# Patient Record
Sex: FEMALE | Race: BLACK OR AFRICAN AMERICAN | Employment: FULL TIME | URBAN - NONMETROPOLITAN AREA
[De-identification: names, ages, dates, MRNs, and addresses within clinical notes are randomized per-mention and may not be internally consistent; named-entity substitution may affect disease eponyms.]

---

## 2020-07-28 ENCOUNTER — VIRTUAL VISIT (OUTPATIENT)
Dept: FAMILY MEDICINE CLINIC | Age: 35
End: 2020-07-28
Payer: MEDICAID

## 2020-07-28 VITALS
HEIGHT: 65 IN | BODY MASS INDEX: 41.25 KG/M2 | OXYGEN SATURATION: 100 % | SYSTOLIC BLOOD PRESSURE: 156 MMHG | TEMPERATURE: 98 F | WEIGHT: 247.6 LBS | HEART RATE: 84 BPM | RESPIRATION RATE: 18 BRPM | DIASTOLIC BLOOD PRESSURE: 90 MMHG

## 2020-07-28 DIAGNOSIS — I10 ESSENTIAL HYPERTENSION: Primary | ICD-10-CM

## 2020-07-28 PROBLEM — K21.9 GASTROESOPHAGEAL REFLUX DISEASE WITHOUT ESOPHAGITIS: Status: ACTIVE | Noted: 2020-01-30

## 2020-07-28 PROBLEM — F31.81 BIPOLAR II DISORDER (HCC): Status: ACTIVE | Noted: 2018-06-26

## 2020-07-28 PROBLEM — E78.5 HYPERLIPIDEMIA: Status: ACTIVE | Noted: 2020-01-30

## 2020-07-28 PROBLEM — R05.3 PERSISTENT COUGH: Status: ACTIVE | Noted: 2020-01-30

## 2020-07-28 PROBLEM — E05.90 HYPERTHYROIDISM: Status: ACTIVE | Noted: 2019-01-03

## 2020-07-28 PROBLEM — R73.03 PREDIABETES: Status: ACTIVE | Noted: 2020-06-29

## 2020-07-28 PROBLEM — E04.9 GOITER: Status: ACTIVE | Noted: 2020-06-29

## 2020-07-28 PROBLEM — R53.83 FATIGUE: Status: ACTIVE | Noted: 2020-07-28

## 2020-07-28 PROBLEM — E03.9 HYPOTHYROIDISM: Status: ACTIVE | Noted: 2019-09-05

## 2020-07-28 PROBLEM — G43.909 MIGRAINE: Status: ACTIVE | Noted: 2020-07-28

## 2020-07-28 PROCEDURE — 99441 PR PHYS/QHP TELEPHONE EVALUATION 5-10 MIN: CPT | Performed by: NURSE PRACTITIONER

## 2020-07-28 RX ORDER — LOVASTATIN 20 MG/1
20 TABLET ORAL
COMMUNITY
End: 2020-08-04 | Stop reason: DRUGHIGH

## 2020-07-28 RX ORDER — CHOLECALCIFEROL TAB 125 MCG (5000 UNIT) 125 MCG
TAB ORAL DAILY
COMMUNITY
End: 2021-10-16

## 2020-07-28 RX ORDER — VALPROIC ACID 250 MG/1
CAPSULE, LIQUID FILLED ORAL 3 TIMES DAILY
COMMUNITY
End: 2020-12-15

## 2020-07-28 RX ORDER — METFORMIN HYDROCHLORIDE 500 MG/1
500 TABLET, FILM COATED, EXTENDED RELEASE ORAL ONCE
COMMUNITY
End: 2020-12-15 | Stop reason: SDUPTHER

## 2020-07-28 RX ORDER — OLMESARTAN MEDOXOMIL 20 MG/1
20 TABLET ORAL DAILY
COMMUNITY
End: 2020-08-17

## 2020-07-28 RX ORDER — MONTELUKAST SODIUM 10 MG/1
10 TABLET ORAL DAILY
COMMUNITY
End: 2021-07-15

## 2020-07-28 RX ORDER — LEVOTHYROXINE SODIUM 50 UG/1
TABLET ORAL
COMMUNITY

## 2020-07-28 RX ORDER — LEVONORGESTREL 52 MG/1
1 INTRAUTERINE DEVICE INTRAUTERINE ONCE
COMMUNITY
End: 2021-07-15

## 2020-07-28 RX ORDER — ARIPIPRAZOLE 10 MG/1
10 TABLET ORAL DAILY
COMMUNITY
End: 2022-03-26

## 2020-07-28 RX ORDER — BROMOCRIPTINE MESYLATE 2.5 MG/1
2.5 TABLET ORAL DAILY
COMMUNITY
End: 2021-03-01

## 2020-07-28 NOTE — PROGRESS NOTES
Luís Yanes is a 37 y.o. female, evaluated via audio-only technology on 7/28/2020 for Hypertension  . Assessment & Plan:   Diagnoses and all orders for this visit:    1. Essential hypertension  Diet and Lifestyle: generally follows a low fat low cholesterol diet, exercises regularly  Home BP Monitoring: is well controlled at home, ranging    12  Subjective:     Luís Yanes is a 37 y.o. female with hypertension. Hypertension  Patient is here for follow-up of hypertension. She indicates that she is feeling well and denies any symptoms referable to her hypertension. She is exercising and is adherent to low salt diet. Blood pressure is well controlled at home. Current Outpatient Medications   Medication Sig Dispense Refill    ARIPiprazole (ABILIFY) 10 mg tablet Take 10 mg by mouth daily.  BUPROPION HCL PO Take  by mouth.  dupilumab (Dupixent) 300 mg/2 mL syrg syringe 300 mg by SubCUTAneous route.  levothyroxine (SYNTHROID) 50 mcg tablet Take  by mouth Daily (before breakfast).  metFORMIN (GLUMETZA ER) 500 mg TG24 24 hour tablet Take  by mouth.  montelukast (SINGULAIR) 10 mg tablet Take 10 mg by mouth daily.  olmesartan (BENICAR) 20 mg tablet Take 20 mg by mouth daily.  valproic acid (DEPAKENE) 250 mg capsule Take  by mouth three (3) times daily.  cholecalciferol (Vitamin D3) (5000 Units/125 mcg) tab tablet Take  by mouth daily.  escitalopram oxalate (LEXAPRO) 10 mg tablet Take 1 Tab by mouth daily. Indications: MAJOR DEPRESSIVE DISORDER 30 Tab 1    citalopram (CELEXA) 20 mg tablet Take 20 mg by mouth daily.  bromocriptine (PARLODEL) 2.5 mg tablet Take 2.5 mg by mouth daily.  lovastatin (MEVACOR) 20 mg tablet Take 20 mg by mouth nightly.  levonorgestreL (Mirena) 20 mcg/24 hours (5 yrs) 52 mg IUD 1 Device by IntraUTERine route once.  lamoTRIgine (LAMICTAL) 25 mg tablet Take 1 Tab by mouth daily.  Indications: DEPRESSION ASSOCIATED WITH BIPOLAR DISORDER 30 Tab 1    traZODone (DESYREL) 50 mg tablet Take 1 Tab by mouth nightly as needed for Sleep. Indications: MAJOR DEPRESSIVE DISORDER 30 Tab 1    risperiDONE (RISPERDAL) 0.5 mg tablet Take 0.5 mg by mouth nightly. Prior to Admission medications    Medication Sig Start Date End Date Taking? Authorizing Provider   ARIPiprazole (ABILIFY) 10 mg tablet Take 10 mg by mouth daily. Yes Provider, Historical   BUPROPION HCL PO Take  by mouth. Yes Provider, Historical   dupilumab (Dupixent) 300 mg/2 mL syrg syringe 300 mg by SubCUTAneous route. Yes Provider, Historical   levothyroxine (SYNTHROID) 50 mcg tablet Take  by mouth Daily (before breakfast). Yes Provider, Historical   metFORMIN (GLUMETZA ER) 500 mg TG24 24 hour tablet Take  by mouth. Yes Provider, Historical   montelukast (SINGULAIR) 10 mg tablet Take 10 mg by mouth daily. Yes Provider, Historical   olmesartan (BENICAR) 20 mg tablet Take 20 mg by mouth daily. Yes Provider, Historical   valproic acid (DEPAKENE) 250 mg capsule Take  by mouth three (3) times daily. Yes Provider, Historical   cholecalciferol (Vitamin D3) (5000 Units/125 mcg) tab tablet Take  by mouth daily. Yes Provider, Historical   escitalopram oxalate (LEXAPRO) 10 mg tablet Take 1 Tab by mouth daily. Indications: MAJOR DEPRESSIVE DISORDER 9/15/16  Yes Knute Peoples., NP   citalopram (CELEXA) 20 mg tablet Take 20 mg by mouth daily. Yes Other, MD [place]   bromocriptine (PARLODEL) 2.5 mg tablet Take 2.5 mg by mouth daily. Provider, Historical   lovastatin (MEVACOR) 20 mg tablet Take 20 mg by mouth nightly. Provider, Historical   levonorgestreL (Mirena) 20 mcg/24 hours (5 yrs) 52 mg IUD 1 Device by IntraUTERine route once. Provider, Historical   lamoTRIgine (LAMICTAL) 25 mg tablet Take 1 Tab by mouth daily.  Indications: DEPRESSION ASSOCIATED WITH BIPOLAR DISORDER 9/15/16   Knute Peoples., NP   traZODone (DESYREL) 50 mg tablet Take 1 Tab by mouth nightly as needed for Sleep. Indications: MAJOR DEPRESSIVE DISORDER 9/15/16   Columba Luo NP   risperiDONE (RISPERDAL) 0.5 mg tablet Take 0.5 mg by mouth nightly. Other, MD [place]         Review of Systems   Constitutional: Negative for chills and fever. Cardiovascular: Negative for chest pain, palpitations and leg swelling. Neurological: Negative for dizziness and headaches. All other systems reviewed and are negative. No flowsheet data found. Holley Hernandez, who was evaluated through a patient-initiated, synchronous (real-time) audio only encounter, and/or her healthcare decision maker, is aware that it is a billable service, with coverage as determined by her insurance carrier. She provided verbal consent to proceed: Yes. She has not had a related appointment within my department in the past 7 days or scheduled within the next 24 hours.       Total Time: minutes: 5-10 minutes    xAel Josue NP

## 2020-07-28 NOTE — PROGRESS NOTES
Robyn De La Vega presents today for   Chief Complaint   Patient presents with    Hypertension       Depression Screening:  3 most recent PHQ Screens 7/28/2020   Little interest or pleasure in doing things Not at all   Feeling down, depressed, irritable, or hopeless More than half the days   Total Score PHQ 2 2   Trouble falling or staying asleep, or sleeping too much More than half the days   Feeling tired or having little energy More than half the days   Poor appetite, weight loss, or overeating More than half the days   Feeling bad about yourself - or that you are a failure or have let yourself or your family down Not at all   Trouble concentrating on things such as school, work, reading, or watching TV Not at all   Moving or speaking so slowly that other people could have noticed; or the opposite being so fidgety that others notice Not at all   Thoughts of being better off dead, or hurting yourself in some way Not at all   PHQ 9 Score 8       Learning Assessment:  No flowsheet data found. Abuse Screening:  No flowsheet data found. Fall Risk  No flowsheet data found. Health Maintenance reviewed and discussed and ordered per Provider. Health Maintenance Due   Topic Date Due    PAP AKA CERVICAL CYTOLOGY  09/11/2016    A1C test (Diabetic or Prediabetic)  09/14/2017    Lipid Screen  09/14/2017   . Coordination of Care:  1. Have you been to the ER, urgent care clinic since your last visit? Hospitalized since your last visit? No/ No    2. Have you seen or consulted any other health care providers outside of the [address] [place] [address] since your last visit? Include any pap smears or colon screening.  [facility]- psych medication     [employer] contract signed:

## 2020-08-04 RX ORDER — LOVASTATIN 10 MG/1
TABLET ORAL
Qty: 90 TAB | Refills: 1 | Status: SHIPPED | OUTPATIENT
Start: 2020-08-04 | End: 2020-12-15 | Stop reason: SDUPTHER

## 2020-08-17 RX ORDER — OLMESARTAN MEDOXOMIL 20 MG/1
20 TABLET ORAL DAILY
Qty: 30 TAB | Refills: 3 | Status: SHIPPED | OUTPATIENT
Start: 2020-08-17 | End: 2020-08-20 | Stop reason: DRUGHIGH

## 2020-08-17 RX ORDER — OLMESARTAN MEDOXOMIL 20 MG/1
20 TABLET ORAL DAILY
COMMUNITY
End: 2020-08-17 | Stop reason: SDUPTHER

## 2020-08-17 RX ORDER — OLMESARTAN MEDOXOMIL 20 MG/1
TABLET ORAL
Qty: 90 TAB | Refills: 1 | Status: SHIPPED | OUTPATIENT
Start: 2020-08-17 | End: 2020-08-20 | Stop reason: DRUGHIGH

## 2020-08-20 ENCOUNTER — TELEPHONE (OUTPATIENT)
Dept: FAMILY MEDICINE CLINIC | Age: 35
End: 2020-08-20

## 2020-08-20 RX ORDER — OLMESARTAN MEDOXOMIL 40 MG/1
40 TABLET ORAL DAILY
Qty: 90 TAB | Refills: 1 | Status: SHIPPED | OUTPATIENT
Start: 2020-08-20 | End: 2021-02-26

## 2020-08-20 RX ORDER — OLMESARTAN MEDOXOMIL 20 MG/1
20 TABLET ORAL 2 TIMES DAILY
COMMUNITY
End: 2020-08-20 | Stop reason: DRUGHIGH

## 2020-08-20 RX ORDER — OLMESARTAN MEDOXOMIL 20 MG/1
20 TABLET ORAL 2 TIMES DAILY
Qty: 60 TAB | Refills: 3 | Status: CANCELLED | OUTPATIENT
Start: 2020-08-20

## 2020-08-20 NOTE — TELEPHONE ENCOUNTER
Patient called stating you added this medication at her last visit in June 2020. She called you back x 1 week after starting it and you increased to 2 tablets once daily due to her b/p still be elevated. Patient adds when you called in her refill you only called in 1 tablet once daily and needs you to send in 2 tablets once daily.

## 2020-12-15 ENCOUNTER — VIRTUAL VISIT (OUTPATIENT)
Dept: FAMILY MEDICINE CLINIC | Age: 35
End: 2020-12-15
Payer: MEDICAID

## 2020-12-15 DIAGNOSIS — E55.9 VITAMIN D DEFICIENCY: ICD-10-CM

## 2020-12-15 DIAGNOSIS — E03.9 ACQUIRED HYPOTHYROIDISM: Primary | ICD-10-CM

## 2020-12-15 DIAGNOSIS — E78.2 MIXED HYPERLIPIDEMIA: ICD-10-CM

## 2020-12-15 DIAGNOSIS — I10 ESSENTIAL HYPERTENSION: ICD-10-CM

## 2020-12-15 DIAGNOSIS — R73.03 PREDIABETES: ICD-10-CM

## 2020-12-15 PROCEDURE — 99443 PR PHYS/QHP TELEPHONE EVALUATION 21-30 MIN: CPT | Performed by: NURSE PRACTITIONER

## 2020-12-15 RX ORDER — METFORMIN HYDROCHLORIDE 500 MG/1
TABLET, FILM COATED, EXTENDED RELEASE ORAL
Qty: 90 TAB | Refills: 1 | Status: SHIPPED | OUTPATIENT
Start: 2020-12-15 | End: 2021-01-14 | Stop reason: SDUPTHER

## 2020-12-15 RX ORDER — LOVASTATIN 10 MG/1
TABLET ORAL
Qty: 90 TAB | Refills: 1 | Status: SHIPPED | OUTPATIENT
Start: 2020-12-15 | End: 2022-03-26

## 2020-12-15 NOTE — PROGRESS NOTES
Jac Abdullahi is a 37 y.o. female, evaluated via audio-only technology on 12/15/2020 for Follow Up Chronic Condition; Hypothyroidism; and Hypertension  . Assessment & Plan:   Diagnoses and all orders for this visit:    1. Acquired hypothyroidism  -She continues with Levothyroxine as prescribed   -Checking labs, I will contact her with results and plan   -     TSH AND FREE T4    2. Vitamin D deficiency  -     VITAMIN D, 25 HYDROXY    3. Mixed hyperlipidemia  -Noncompliant with medication use,patient educated on the importance of medication compliance, I have asked her to bring a copy of lab work for review. Labs were drawn two weeks ago. - Restarting medication she was previously on   -     lovastatin (MEVACOR) 10 mg tablet; TAKE 1 TABLET BY MOUTH EVERY NIGHT AT BEDTIME  Indications: high cholesterol and high triglycerides    4. Prediabetes  -Restart medication, diet reviewed   -     metFORMIN (GLUMETZA ER) 500 mg TG24 24 hour tablet; Take one tab by mouth once daily  Indications: prevention of type 2 diabetes mellitus    5. Essential hypertension  -Patient was unable to provide blood pressure readings   - She is prescribed Olmesartan 40 mg and will continue with this. - She is working on getting a blood pressure cuff. She can come into the office for blood pressure check. 12  Subjective:   Patient is here today for follow-up of hypertension, prediabetes, and hyperlipidemia. Patient reports she is followed by SHARMIN Carpio for Bipolar II disorder. She reports being told her prediabetes has worsened, blood pressure is uncontrolled, and cholesterol is elevated. Patient was prescribed Metformin, statin medication and hypertension medication several months ago but has not been taking medication. She does not have a copy of lab results and is unsure what her blood pressure readings have been. She denies any shortness of breath or chest pains.  No lightheadedness or dizziness. No abdominal pains. No urinary concerns. No worsening of Bipolar symptoms, controlled with medication use and counseling. Prior to Admission medications    Medication Sig Start Date End Date Taking? Authorizing Provider   olmesartan (BENICAR) 40 mg tablet Take 1 Tab by mouth daily. 8/20/20  Yes Beth Granados NP   ARIPiprazole (ABILIFY) 10 mg tablet Take 10 mg by mouth daily. Yes Provider, Historical   bromocriptine (PARLODEL) 2.5 mg tablet Take 2.5 mg by mouth daily. Yes Provider, Historical   BUPROPION HCL PO Take 200 mg by mouth once. Yes Provider, Historical   levothyroxine (SYNTHROID) 50 mcg tablet Take  by mouth Daily (before breakfast). Yes Provider, Historical   metFORMIN (GLUMETZA ER) 500 mg TG24 24 hour tablet Take 500 mg by mouth once. Yes Provider, Historical   cholecalciferol (Vitamin D3) (5000 Units/125 mcg) tab tablet Take  by mouth daily. Yes Provider, Historical   lovastatin (MEVACOR) 10 mg tablet TAKE 1 TABLET BY MOUTH EVERY NIGHT AT BEDTIME 8/4/20   Tayler Swan NP   dupilumab (Dupixent) 300 mg/2 mL syrg syringe 300 mg by SubCUTAneous route. Provider, Historical   levonorgestreL (Mirena) 20 mcg/24 hours (5 yrs) 52 mg IUD 1 Device by IntraUTERine route once. Provider, Historical   montelukast (SINGULAIR) 10 mg tablet Take 10 mg by mouth daily. Provider, Historical   valproic acid (DEPAKENE) 250 mg capsule Take  by mouth three (3) times daily. Provider, Historical   escitalopram oxalate (LEXAPRO) 10 mg tablet Take 1 Tab by mouth daily. Indications: MAJOR DEPRESSIVE DISORDER 9/15/16   Russel Collet., NP   lamoTRIgine (LAMICTAL) 25 mg tablet Take 1 Tab by mouth daily. Indications: DEPRESSION ASSOCIATED WITH BIPOLAR DISORDER 9/15/16   Clint Collet., NP   traZODone (DESYREL) 50 mg tablet Take 1 Tab by mouth nightly as needed for Sleep.  Indications: MAJOR DEPRESSIVE DISORDER 9/15/16   Russelt Collet., NP   citalopram (CELEXA) 20 mg tablet Take 20 mg by mouth daily. [place] Chambers MD   risperiDONE (RISPERDAL) 0.5 mg tablet Take 0.5 mg by mouth nightly. [place] Chambers MD         Review of Systems   Constitutional: Negative for chills, fever, malaise/fatigue and weight loss. HENT: Negative for sinus pain and sore throat. Eyes: Negative for blurred vision. Respiratory: Negative for cough, sputum production, shortness of breath and wheezing. Cardiovascular: Negative for chest pain, palpitations and leg swelling. Gastrointestinal: Negative for abdominal pain, constipation, diarrhea and heartburn. Genitourinary: Negative for dysuria and frequency. Musculoskeletal: Negative for falls and myalgias. Skin: Negative for rash. Neurological: Negative for dizziness, tingling, weakness and headaches. Endo/Heme/Allergies: Does not bruise/bleed easily. Psychiatric/Behavioral: Negative for depression. The patient is not nervous/anxious and does not have insomnia. Physical Exam  Vitals signs and nursing note reviewed. Constitutional:       Comments: Physical exam was deferred due to COVID- 19 precautions as visit was completed via telemedicine. No flowsheet data found. Meryle Como, who was evaluated through a patient-initiated, synchronous (real-time) audio only encounter, and/or her healthcare decision maker, is aware that it is a billable service, with coverage as determined by her insurance carrier. She provided verbal consent to proceed: Yes. She has not had a related appointment within my department in the past 7 days or scheduled within the next 24 hours.       Total Time: minutes: 21-30 minutes    Kai Liang NP

## 2020-12-15 NOTE — PROGRESS NOTES
Paul Ferraro presents today for   Chief Complaint   Patient presents with    Follow Up Chronic Condition    Hypothyroidism    Hypertension       Depression Screening:  3 most recent PHQ Screens 12/15/2020   Little interest or pleasure in doing things Not at all   Feeling down, depressed, irritable, or hopeless Not at all   Total Score PHQ 2 0   Trouble falling or staying asleep, or sleeping too much -   Feeling tired or having little energy -   Poor appetite, weight loss, or overeating -   Feeling bad about yourself - or that you are a failure or have let yourself or your family down -   Trouble concentrating on things such as school, work, reading, or watching TV -   Moving or speaking so slowly that other people could have noticed; or the opposite being so fidgety that others notice -   Thoughts of being better off dead, or hurting yourself in some way -   PHQ 9 Score -       Learning Assessment:  Learning Assessment 12/15/2020   PRIMARY LEARNER Patient   HIGHEST LEVEL OF EDUCATION - PRIMARY LEARNER  GRADUATED HIGH SCHOOL OR GED   BARRIERS PRIMARY LEARNER NONE   CO-LEARNER CAREGIVER No   PRIMARY LANGUAGE ENGLISH   LEARNER PREFERENCE PRIMARY DEMONSTRATION   ANSWERED BY Janette Falk   RELATIONSHIP SELF       Abuse Screening:  No flowsheet data found. Fall Risk  Fall Risk Assessment, last 12 mths 12/15/2020   Able to walk? Yes   Fall in past 12 months?  No       ADL  ADL Assessment 12/15/2020   Feeding yourself No Help Needed   Getting from bed to chair No Help Needed   Getting dressed No Help Needed   Bathing or showering No Help Needed   Walk across the room (includes cane/walker) No Help Needed   Using the telphone No Help Needed   Taking your medications No Help Needed   Preparing meals No Help Needed   Managing money (expenses/bills) No Help Needed   Moderately strenuous housework (laundry) No Help Needed   Shopping for personal items (toiletries/medicines) No Help Needed   Shopping for groceries No Help Needed   Driving No Help Needed   Climbing a flight of stairs No Help Needed   Getting to places beyond walking distances No Help Needed       Health Maintenance reviewed and discussed and ordered per Provider. Health Maintenance Due   Topic Date Due    PAP AKA CERVICAL CYTOLOGY  09/11/2016    A1C test (Diabetic or Prediabetic)  09/14/2017    Lipid Screen  09/14/2017    Flu Vaccine (1) 09/01/2020   . Coordination of Care:  1. Have you been to the ER, urgent care clinic since your last visit? Hospitalized since your last visit? No    2. Have you seen or consulted any other health care providers outside of the [facility] [place] [address] [place] since your last visit? Include any pap smears or colon screening.  [place]-x 2 weeks dallin    Pap- 2020 per patient

## 2021-01-14 ENCOUNTER — VIRTUAL VISIT (OUTPATIENT)
Dept: FAMILY MEDICINE CLINIC | Age: 36
End: 2021-01-14
Payer: MEDICAID

## 2021-01-14 DIAGNOSIS — E78.2 MODERATE MIXED HYPERLIPIDEMIA NOT REQUIRING STATIN THERAPY: ICD-10-CM

## 2021-01-14 DIAGNOSIS — I10 ESSENTIAL HYPERTENSION: Primary | ICD-10-CM

## 2021-01-14 DIAGNOSIS — R73.03 PREDIABETES: ICD-10-CM

## 2021-01-14 DIAGNOSIS — I10 ESSENTIAL HYPERTENSION: ICD-10-CM

## 2021-01-14 PROCEDURE — 99213 OFFICE O/P EST LOW 20 MIN: CPT | Performed by: INTERNAL MEDICINE

## 2021-01-14 RX ORDER — AMLODIPINE BESYLATE 5 MG/1
5 TABLET ORAL DAILY
Qty: 30 TAB | Refills: 2 | Status: SHIPPED | OUTPATIENT
Start: 2021-01-14 | End: 2021-03-03 | Stop reason: SDUPTHER

## 2021-01-14 RX ORDER — METFORMIN HYDROCHLORIDE 500 MG/1
TABLET, FILM COATED, EXTENDED RELEASE ORAL
Qty: 90 TAB | Refills: 1 | Status: SHIPPED | OUTPATIENT
Start: 2021-01-14 | End: 2021-01-18 | Stop reason: SDUPTHER

## 2021-01-14 NOTE — PROGRESS NOTES
Patient in for virtual visit complains of elevated blood pressure. She is taking BP at home with readings of 171/107, 154/100, 153/98. This has been going on since medication was changed on 12/15/20. Patient is complaining of headaches when her BP is high. Princess Dyan LPN    1. Have you been to the ER, urgent care clinic since your last visit? Hospitalized since your last visit? No    2. Have you seen or consulted any other health care providers outside of the [address] [place] [address] since your last visit? Include any pap smears or colon screening.  No

## 2021-01-14 NOTE — PATIENT INSTRUCTIONS
Type 2 Diabetes: Care Instructions Your Care Instructions Type 2 diabetes is a disease that develops when the body's tissues cannot use insulin properly. Over time, the pancreas cannot make enough insulin. Insulin is a hormone that helps the body's cells use sugar (glucose) for energy. It also helps the body store extra sugar in muscle, fat, and liver cells. Without insulin, the sugar cannot get into the cells to do its work. It stays in the blood instead. This can cause high blood sugar levels. A person has diabetes when the blood sugar stays too high too much of the time. Over time, diabetes can lead to diseases of the heart, blood vessels, nerves, kidneys, and eyes. You may be able to control your blood sugar by losing weight, eating a healthy diet, and getting daily exercise. You may also have to take insulin or other diabetes medicine. Follow-up care is a key part of your treatment and safety. Be sure to make and go to all appointments. Call your doctor if you are having problems. It's also a good idea to know your test results and keep a list of the medicines you take. How can you care for yourself at home? · Keep your blood sugar at a target level (which you set with your doctor). ? Eat a good diet that spreads carbohydrate throughout the day. Carbohydratethe body's main source of fuelaffects blood sugar more than any other nutrient. Carbohydrate is in fruits, vegetables, milk, and yogurt. It also is in breads, cereals, vegetables such as potatoes and corn, and sugary foods such as candy and cakes. ? Aim for 30 minutes of exercise on most, preferably all, days of the week. Walking is a good choice. You also may want to do other activities, such as running, swimming, cycling, or playing tennis or team sports. If your doctor says it's okay, do muscle-strengthening exercises at least 2 times a week. ? Take your medicines exactly as prescribed. Call your doctor if you think you are having a problem with your medicine. You will get more details on the specific medicines your doctor prescribes. · Check your blood sugar as often as your doctor recommends. It is important to keep track of any symptoms you have, such as low blood sugar. Also tell your doctor if you have any changes in your activities, diet, or insulin use. · Talk to your doctor before you start taking aspirin every day. Aspirin can help certain people lower their risk of a heart attack or stroke. But taking aspirin isn't right for everyone, because it can cause serious bleeding. · Do not smoke. If you need help quitting, talk to your doctor about stop-smoking programs and medicines. These can increase your chances of quitting for good. · Keep your cholesterol and blood pressure at normal levels. You may need to take one or more medicines to reach your goals. Take them exactly as directed. Do not stop or change a medicine without talking to your doctor first. 
When should you call for help? Call 911 anytime you think you may need emergency care. For example, call if: 
  · You passed out (lost consciousness), or you suddenly become very sleepy or confused. (You may have very low blood sugar.) Call your doctor now or seek immediate medical care if: 
  · Your blood sugar is 300 mg/dL or is higher than the level your doctor has set for you.  
  · You have symptoms of low blood sugar, such as: ? Sweating. ? Feeling nervous, shaky, and weak. ? Extreme hunger and slight nausea. ? Dizziness and headache. 
? Blurred vision. ? Confusion. Watch closely for changes in your health, and be sure to contact your doctor if: 
  · You often have problems controlling your blood sugar.  
  · You have symptoms of long-term diabetes problems, such as: ? New vision changes. ? New pain, numbness, or tingling in your hands or feet. ? Skin problems. Where can you learn more? Go to http://www.gray.com/ Enter C553 in the search box to learn more about \"Type 2 Diabetes: Care Instructions. \" Current as of: December 20, 2019               Content Version: 12.6 © 0405-8607 [employer]. Care instructions adapted under license by [employer] (which disclaims liability or warranty for this information). If you have questions about a medical condition or this instruction, always ask your healthcare professional. [address] any warranty or liability for your use of this information.

## 2021-01-14 NOTE — PROGRESS NOTES
Danuta Mejia (: 1985) is a 37 y.o. female, established patient, here for evaluation of the following chief complaint(s):   Hypertension   Some headaches, 171/101, stays all the time. No syncope. No chest pain    ASSESSMENT/PLAN:  1. Prediabetes  Refill her Metformin as well as follow-up in A1c. Continue to exercise and control weight as possible. - metFORMIN (GLUMETZA ER) 500 mg TG24 24 hour tablet; Take one tab by mouth once daily  Indications: prevention of type 2 diabetes mellitus  Dispense: 90 Tab; Refill: 1  - HEMOGLOBIN A1C WITH EAG; Future  - LIPID PANEL; Future  - METABOLIC PANEL, COMPREHENSIVE; Future  - MICROALBUMIN, UR, RAND W/ MICROALB/CREAT RATIO; Future    2. Essential hypertension  Due to elevated amounts we will go ahead and add her amlodipine to her current regimen of olmesartan. Exercise as well as weight control.  - HEMOGLOBIN A1C WITH EAG; Future  - LIPID PANEL; Future  - METABOLIC PANEL, COMPREHENSIVE; Future  - MICROALBUMIN, UR, RAND W/ MICROALB/CREAT RATIO; Future    3. Moderate mixed hyperlipidemia not requiring statin therapy  Continue to check labs as well as watch diet. - LIPID PANEL; Future  - METABOLIC PANEL, COMPREHENSIVE; Future  - MICROALBUMIN, UR, RAND W/ MICROALB/CREAT RATIO; Future      No follow-ups on file. SUBJECTIVE/OBJECTIVE:  HPI    Review of Systems     No flowsheet data found. Physical Exam    Exam on virtual visit with normal mentation normal speech normal hearing. On this date 21 I have spent 10 minutes reviewing previous notes, test results and face to face (virtual) with the patient discussing the diagnosis and importance of compliance with the treatment plan as well as documenting on the day of the visit. Danuta Mejia is being evaluated by a Virtual Visit (video visit) encounter to address concerns as mentioned above. A caregiver was present when appropriate.  Due to this being a TeleHealth encounter (During [facility] public health emergency), evaluation of the following organ systems was limited: Vitals/Constitutional/EENT/Resp/CV/GI//MS/Neuro/Skin/Heme-Lymph-Imm. Pursuant to the emergency declaration under the [address] [place] [address], [address] [place] [address] authority and the [employer] Act, this Virtual Visit was conducted with patient's (and/or legal guardian's) consent, to reduce the patient's risk of exposure to COVID-19 and provide necessary medical care. The patient (and/or legal guardian) has also been advised to contact this office for worsening conditions or problems, and seek emergency medical treatment and/or call 911 if deemed necessary. Patient identification was verified at the start of the visit: YES    Services were provided through a video synchronous discussion virtually to substitute for in-person clinic visit. Patient was located at home and provider was located in office or at home. An electronic signature was used to authenticate this note.   -- Rigo Courtney MD

## 2021-01-18 DIAGNOSIS — R73.03 PREDIABETES: ICD-10-CM

## 2021-01-18 RX ORDER — METFORMIN HYDROCHLORIDE 500 MG/1
TABLET, FILM COATED, EXTENDED RELEASE ORAL
Qty: 90 TAB | Refills: 3 | Status: SHIPPED | OUTPATIENT
Start: 2021-01-18 | End: 2021-01-22 | Stop reason: ALTCHOICE

## 2021-01-22 ENCOUNTER — TELEPHONE (OUTPATIENT)
Dept: FAMILY MEDICINE CLINIC | Age: 36
End: 2021-01-22

## 2021-01-22 DIAGNOSIS — R73.03 PREDIABETES: Primary | ICD-10-CM

## 2021-01-22 RX ORDER — METFORMIN HYDROCHLORIDE 500 MG/1
500 TABLET, EXTENDED RELEASE ORAL 2 TIMES DAILY
Qty: 60 TAB | Refills: 2 | Status: SHIPPED | OUTPATIENT
Start: 2021-01-22 | End: 2021-10-16

## 2021-02-26 RX ORDER — OLMESARTAN MEDOXOMIL 40 MG/1
TABLET ORAL
Qty: 90 TAB | Refills: 1 | Status: SHIPPED | OUTPATIENT
Start: 2021-02-26 | End: 2021-04-14 | Stop reason: ALTCHOICE

## 2021-03-01 ENCOUNTER — APPOINTMENT (OUTPATIENT)
Dept: GENERAL RADIOLOGY | Age: 36
End: 2021-03-01
Attending: FAMILY MEDICINE
Payer: MEDICAID

## 2021-03-01 ENCOUNTER — HOSPITAL ENCOUNTER (EMERGENCY)
Age: 36
Discharge: HOME | End: 2021-03-01
Attending: FAMILY MEDICINE
Payer: MEDICAID

## 2021-03-01 VITALS
SYSTOLIC BLOOD PRESSURE: 153 MMHG | RESPIRATION RATE: 16 BRPM | HEIGHT: 66 IN | DIASTOLIC BLOOD PRESSURE: 94 MMHG | HEART RATE: 86 BPM | WEIGHT: 245 LBS | BODY MASS INDEX: 39.37 KG/M2 | TEMPERATURE: 98.1 F | OXYGEN SATURATION: 100 %

## 2021-03-01 DIAGNOSIS — I15.9 SECONDARY HYPERTENSION: ICD-10-CM

## 2021-03-01 DIAGNOSIS — R00.2 PALPITATIONS: Primary | ICD-10-CM

## 2021-03-01 LAB
ANION GAP SERPL CALC-SCNC: 9 MMOL/L
BASOPHILS # BLD: 0 K/UL (ref 0–0.1)
BASOPHILS NFR BLD: 1 % (ref 0–2)
BUN SERPL-MCNC: 16 MG/DL (ref 9–21)
BUN/CREAT SERPL: 20
CA-I BLD-MCNC: 9.1 MG/DL (ref 8.5–10.5)
CHLORIDE SERPL-SCNC: 106 MMOL/L (ref 94–111)
CO2 SERPL-SCNC: 23 MMOL/L (ref 21–33)
CREAT SERPL-MCNC: 0.8 MG/DL (ref 0.7–1.2)
EOSINOPHIL # BLD: 0.1 K/UL (ref 0–0.4)
EOSINOPHIL NFR BLD: 2 % (ref 0–5)
ERYTHROCYTE [DISTWIDTH] IN BLOOD BY AUTOMATED COUNT: 14.6 % (ref 11.6–14.5)
GLUCOSE SERPL-MCNC: 90 MG/DL (ref 70–110)
HCT VFR BLD AUTO: 38.5 % (ref 35–45)
HGB BLD-MCNC: 12.5 G/DL (ref 12–16)
IMM GRANULOCYTES # BLD AUTO: 0 K/UL
IMM GRANULOCYTES NFR BLD AUTO: 0 %
LYMPHOCYTES # BLD: 2.6 K/UL (ref 0.9–3.6)
LYMPHOCYTES NFR BLD: 40 % (ref 21–52)
MAGNESIUM SERPL-MCNC: 2.1 MG/DL (ref 1.7–2.8)
MCH RBC QN AUTO: 28.7 PG (ref 24–34)
MCHC RBC AUTO-ENTMCNC: 32.5 G/DL (ref 31–37)
MCV RBC AUTO: 88.3 FL (ref 74–97)
MONOCYTES # BLD: 0.5 K/UL (ref 0.05–1.2)
MONOCYTES NFR BLD: 8 % (ref 3–10)
NEUTS SEG # BLD: 3.2 K/UL (ref 1.8–8)
NEUTS SEG NFR BLD: 49 % (ref 40–73)
PLATELET # BLD AUTO: 341 K/UL (ref 135–420)
PMV BLD AUTO: 10.5 FL (ref 9.2–11.8)
POTASSIUM SERPL-SCNC: 4.1 MMOL/L (ref 3.2–5.1)
RBC # BLD AUTO: 4.36 M/UL (ref 4.2–5.3)
SODIUM SERPL-SCNC: 138 MMOL/L (ref 135–145)
TROPONIN I SERPL-MCNC: <0.02 NG/ML (ref 0.02–0.05)
WBC # BLD AUTO: 6.5 K/UL (ref 4.6–13.2)

## 2021-03-01 PROCEDURE — 71045 X-RAY EXAM CHEST 1 VIEW: CPT

## 2021-03-01 PROCEDURE — 85025 COMPLETE CBC W/AUTO DIFF WBC: CPT

## 2021-03-01 PROCEDURE — 93005 ELECTROCARDIOGRAM TRACING: CPT

## 2021-03-01 PROCEDURE — 99284 EMERGENCY DEPT VISIT MOD MDM: CPT

## 2021-03-01 PROCEDURE — 84484 ASSAY OF TROPONIN QUANT: CPT

## 2021-03-01 PROCEDURE — 83735 ASSAY OF MAGNESIUM: CPT

## 2021-03-01 PROCEDURE — 80048 BASIC METABOLIC PNL TOTAL CA: CPT

## 2021-03-01 RX ORDER — HYDROCHLOROTHIAZIDE 25 MG/1
25 TABLET ORAL DAILY
Qty: 30 TAB | Refills: 0 | Status: SHIPPED | OUTPATIENT
Start: 2021-03-01 | End: 2021-03-31

## 2021-03-01 RX ORDER — HYDROXYZINE 50 MG/1
50 TABLET, FILM COATED ORAL
COMMUNITY
End: 2021-07-15

## 2021-03-01 NOTE — ED PROVIDER NOTES
EMERGENCY DEPARTMENT HISTORY AND PHYSICAL EXAM      Date: 3/1/2021  Patient Name: Radha Vieira    History of Presenting Illness     Chief Complaint   Patient presents with    Hypertension       History Provided By: Patient    HPI: Radha Vieira, 37 y.o. female with pertinent PMHx of anxiety, bipolar disorder, prediabetes, hypertension, hyperlipidemia, hypothyroidism presents to the ED c/o elevated blood pressure x 3 days. Pt was started on a new medication 2 months ago, but states the medication does not effectively control her blood pressure. Pt reports having an associated headache x 3 days. Pain is located in the left frontal area and described as pounding when present. Pain comes and goes, and is not present at this time. She also reports palpitations since yesterday, but denies CP, SOB, N/V, leg swelling, fevers, chills, or any other sx. Pt has taken Tylenol w/good pain relief. In ED, patient's BP is 164/96. There are no other complaints, changes, or physical findings at this time. PCP: Elena Lam NP    No current facility-administered medications on file prior to encounter. Current Outpatient Medications on File Prior to Encounter   Medication Sig Dispense Refill    hydrOXYzine HCL (ATARAX) 50 mg tablet Take 50 mg by mouth three (3) times daily as needed.  olmesartan (BENICAR) 40 mg tablet TAKE 1 TABLET BY MOUTH DAILY 90 Tab 1    metFORMIN ER (GLUCOPHAGE XR) 500 mg tablet Take 1 Tab by mouth two (2) times a day. 60 Tab 2    amLODIPine (NORVASC) 5 mg tablet Take 1 Tab by mouth daily. 30 Tab 2    lovastatin (MEVACOR) 10 mg tablet TAKE 1 TABLET BY MOUTH EVERY NIGHT AT BEDTIME  Indications: high cholesterol and high triglycerides 90 Tab 1    ARIPiprazole (ABILIFY) 10 mg tablet Take 10 mg by mouth daily.  BUPROPION HCL PO Take 200 mg by mouth once.  levothyroxine (SYNTHROID) 50 mcg tablet Take  by mouth Daily (before breakfast).       levonorgestreL (Mirena) 20 mcg/24 hours (5 yrs) 52 mg IUD 1 Device by IntraUTERine route once.  montelukast (SINGULAIR) 10 mg tablet Take 10 mg by mouth daily.  cholecalciferol (Vitamin D3) (5000 Units/125 mcg) tab tablet Take  by mouth daily.  [DISCONTINUED] bromocriptine (PARLODEL) 2.5 mg tablet Take 2.5 mg by mouth daily. Past History     Past Medical History:  Past Medical History:   Diagnosis Date    Anxiety     Bipolar II disorder ([facility] [address] 6/26/2018    Depression     Essential hypertension 1/30/2020    Fatigue 7/28/2020    Gastroesophageal reflux disease without esophagitis 1/30/2020    Goiter 6/29/2020    Hyperlipidemia 1/30/2020    Hyperthyroidism 1/3/2019    Hypothyroidism 9/5/2019    Migraine 7/28/2020    Panic attack     Persistent cough 1/30/2020    Postpartum depression 7/28/2020    Prediabetes 6/29/2020    Psychiatric disorder     depression with SI   Migraines     Sleep disorder     Suicidal thoughts        Past Surgical History:  Past Surgical History:   Procedure Laterality Date    HX GYN      2 c-sections       Family History:  Family History   Problem Relation Age of Onset    Diabetes Paternal Aunt     Diabetes Maternal Grandmother     Hypertension Maternal Grandmother     Heart Disease Mother     Diabetes Father        Social History:  Social History     Tobacco Use    Smoking status: Never Smoker    Smokeless tobacco: Never Used   Substance Use Topics    Alcohol use: No    Drug use: No       Allergies: Allergies   Allergen Reactions    Codeine Shortness of Breath    Flexeril [Cyclobenzaprine] Unknown (comments)    [place] Unknown (comments)         Review of Systems   Review of Systems   Constitutional: Negative for chills, fatigue and fever. HENT: Negative for congestion, rhinorrhea and sore throat. Eyes: Negative for pain, redness and visual disturbance. Respiratory: Negative for cough, shortness of breath and wheezing. Cardiovascular: Positive for palpitations. Negative for chest pain and leg swelling. Gastrointestinal: Negative for abdominal pain, diarrhea, nausea and vomiting. Musculoskeletal: Negative for arthralgias, back pain and myalgias. Skin: Negative for color change and rash. Neurological: Negative for dizziness, light-headedness and headaches. Psychiatric/Behavioral: Negative for confusion. The patient is nervous/anxious. Physical Exam   Physical Exam  Vitals signs and nursing note reviewed. Constitutional:       General: She is awake. She is not in acute distress. Appearance: Normal appearance. She is well-developed and overweight. She is not ill-appearing, toxic-appearing or diaphoretic. Interventions: Face mask in place. HENT:      Head: Normocephalic and atraumatic. Eyes:      Conjunctiva/sclera: Conjunctivae normal.      Pupils: Pupils are equal, round, and reactive to light. Neck:      Musculoskeletal: Normal range of motion. Cardiovascular:      Rate and Rhythm: Normal rate and regular rhythm. Pulses: Normal pulses. Heart sounds: Normal heart sounds. Pulmonary:      Effort: Pulmonary effort is normal.      Breath sounds: Normal breath sounds. Abdominal:      General: Abdomen is flat. Palpations: Abdomen is soft. Tenderness: There is no abdominal tenderness. Musculoskeletal:      Right lower leg: No edema. Left lower leg: No edema. Skin:     General: Skin is warm and dry. Neurological:      Mental Status: She is alert and oriented to person, place, and time. GCS: GCS eye subscore is 4. GCS verbal subscore is 5. GCS motor subscore is 6. Psychiatric:         Mood and Affect: Affect normal. Mood is anxious. Behavior: Behavior normal. Behavior is cooperative. Thought Content:  Thought content normal.         Diagnostic Study Results     Labs -     Recent Results (from the past 12 hour(s))   CBC WITH AUTOMATED DIFF    Collection Time: 03/01/21  4:55 PM   Result Value Ref Range    WBC 6.5 4.6 - 13.2 K/uL    RBC 4.36 4.20 - 5.30 M/uL    HGB 12.5 12.0 - 16.0 g/dL    HCT 38.5 35.0 - 45.0 %    MCV 88.3 74.0 - 97.0 FL    MCH 28.7 24.0 - 34.0 PG    MCHC 32.5 31.0 - 37.0 g/dL    RDW 14.6 (H) 11.6 - 14.5 %    PLATELET 244 747 - 805 K/uL    MPV 10.5 9.2 - 11.8 FL    NEUTROPHILS 49 40 - 73 %    LYMPHOCYTES 40 21 - 52 %    MONOCYTES 8 3 - 10 %    EOSINOPHILS 2 0 - 5 %    BASOPHILS 1 0 - 2 %    IMMATURE GRANULOCYTES 0 %    ABS. NEUTROPHILS 3.2 1.8 - 8.0 K/UL    ABS. LYMPHOCYTES 2.6 0.9 - 3.6 K/UL    ABS. MONOCYTES 0.5 0.05 - 1.2 K/UL    ABS. EOSINOPHILS 0.1 0.0 - 0.4 K/UL    ABS. BASOPHILS 0.0 0.0 - 0.1 K/UL    ABS. IMM. GRANS. 0.0 K/UL   METABOLIC PANEL, BASIC    Collection Time: 03/01/21  4:55 PM   Result Value Ref Range    Sodium 138 135 - 145 mmol/L    Potassium 4.1 3.2 - 5.1 mmol/L    Chloride 106 94 - 111 mmol/L    CO2 23 21 - 33 mmol/L    Anion gap 9 mmol/L    Glucose 90 70 - 110 mg/dL    BUN 16 9 - 21 mg/dL    Creatinine 0.80 0.70 - 1.20 mg/dL    BUN/Creatinine ratio 20      GFR est AA >60 ml/min/1.73m2    GFR est non-AA >60 ml/min/1.73m2    Calcium 9.1 8.5 - 10.5 mg/dL   TROPONIN I    Collection Time: 03/01/21  4:55 PM   Result Value Ref Range    Troponin-I, Qt. <0.02 (L) 0.02 - 0.05 ng/mL   MAGNESIUM    Collection Time: 03/01/21  4:55 PM   Result Value Ref Range    Magnesium 2.1 1.7 - 2.8 mg/dL       Radiologic Studies -   XR CHEST PORT    (Results Pending)     CT Results  (Last 48 hours)    None        CXR Results  (Last 48 hours)    None            Medical Decision Making   I am the first provider for this patient. I reviewed the vital signs, available nursing notes, past medical history, past surgical history, family history and social history. Vital Signs-Reviewed the patient's vital signs.   Patient Vitals for the past 12 hrs:   Temp Pulse Resp BP SpO2   03/01/21 1752  82 16 (!) 147/91 100 %   03/01/21 1725  82 16 (!) 152/99 100 %   03/01/21 1638 98.1 °F (36.7 °C) 87 16 (!) 164/96 100 %     EKG interpretation (Preliminary): Performed at 4:45 PM; Read at 4:45 PM.  Rhythm: normal sinus rhythm; Rate (approx.) 82; Axis: normal; MD interval: normal; QRS interval: normal ; ST/T wave: normal; Other findings: normal, no evidence of ischemia. Records Reviewed: Nursing Notes and Old Medical Records    The patient presents with palpitations with a differential diagnosis of palpitations, electrolyte abnormality, anemia, and dysrhythmia. ED Course:   Initial assessment performed. The patients presenting problems have been discussed, and they are in agreement with the care plan formulated and outlined with them. I have encouraged them to ask questions as they arise throughout their visit. Provider Notes (Medical Decision Making):     2871 -patient is a 51-year-old female who presented with complaint of palpitations. She is also complaining of her blood pressure being up recently and her having some headaches due to this. She is tried repeatedly to get a hold of her PCP but apparently has been unable to do so. She says she is left messages several times over the last couple weeks. Her blood pressure was slightly elevated on arrival.  Her EKG was normal without ST changes. Her troponin was negative her electrolytes were normal and her CBC was normal.  Her chest x-ray was also normal.  Patient did not have any palpitations while here. I decided to add some HCTZ to her hypertensive regimen. She needs a follow-up with her PCP in 2 weeks. Disposition     Disposition:     PLAN:  1. Current Discharge Medication List      START taking these medications    Details   hydroCHLOROthiazide (HYDRODIURIL) 25 mg tablet Take 1 Tab by mouth daily for 30 days. Qty: 30 Tab, Refills: 0           2.    Follow-up Information     Follow up With Specialties Details Why Contact Info    John Mir NP Nurse Practitioner In 2 weeks  [address] [facility]  [address] [address]  [phone] Return to ED if worse     Diagnosis     Clinical Impression:   1. Palpitations    2. Secondary hypertension        Attestations:    Denise Wolff MD    By signing my name below, Ranjit Ruiz, attalba that this documentation has been prepared under the direction and in presence of Dr. Nelda Patel on 03/01/21. Electronically signed: Barb Fong, 03/01/21, 4:45 PM    Please note that this dictation was completed with [employer], the computer voice recognition software. Quite often unanticipated grammatical, syntax, homophones, and other interpretive errors are inadvertently transcribed by the computer software. Please disregard these errors. Please excuse any errors that have escaped final proofreading. Thank you.

## 2021-03-01 NOTE — ED TRIAGE NOTES
Pt states she has had high blood pressure x 3 days, states she started a new medicine in December but it doesn't work.   Pt states \"they wont answer the phone\" when asked if she called her PCP

## 2021-03-02 LAB
ATRIAL RATE: 83 BPM
CALCULATED P AXIS, ECG09: 10 DEGREES
CALCULATED R AXIS, ECG10: 12 DEGREES
CALCULATED T AXIS, ECG11: 22 DEGREES
DIAGNOSIS, 93000: NORMAL
P-R INTERVAL, ECG05: 165 MS
Q-T INTERVAL, ECG07: 366 MS
QRS DURATION, ECG06: 90 MS
QTC CALCULATION (BEZET), ECG08: 428 MS
VENTRICULAR RATE, ECG03: 82 BPM

## 2021-03-03 RX ORDER — AMLODIPINE BESYLATE 5 MG/1
10 TABLET ORAL DAILY
Qty: 30 TAB | Refills: 2 | Status: SHIPPED | OUTPATIENT
Start: 2021-03-03 | End: 2021-07-15

## 2021-04-14 ENCOUNTER — OFFICE VISIT (OUTPATIENT)
Dept: FAMILY MEDICINE CLINIC | Age: 36
End: 2021-04-14
Payer: MEDICAID

## 2021-04-14 VITALS — HEART RATE: 75 BPM | SYSTOLIC BLOOD PRESSURE: 160 MMHG | DIASTOLIC BLOOD PRESSURE: 90 MMHG

## 2021-04-14 DIAGNOSIS — R53.83 FATIGUE DUE TO DEPRESSION: Primary | ICD-10-CM

## 2021-04-14 DIAGNOSIS — I10 ESSENTIAL HYPERTENSION: ICD-10-CM

## 2021-04-14 DIAGNOSIS — F32.A FATIGUE DUE TO DEPRESSION: Primary | ICD-10-CM

## 2021-04-14 PROCEDURE — 99214 OFFICE O/P EST MOD 30 MIN: CPT | Performed by: INTERNAL MEDICINE

## 2021-04-14 RX ORDER — METOPROLOL SUCCINATE 25 MG/1
25 TABLET, EXTENDED RELEASE ORAL DAILY
Qty: 90 TAB | Refills: 2 | Status: SHIPPED | OUTPATIENT
Start: 2021-04-14 | End: 2021-10-16

## 2021-04-14 RX ORDER — LISINOPRIL AND HYDROCHLOROTHIAZIDE 20; 25 MG/1; MG/1
1 TABLET ORAL DAILY
Qty: 90 TAB | Refills: 2 | Status: SHIPPED | OUTPATIENT
Start: 2021-04-14

## 2021-04-14 RX ORDER — INSULIN PUMP SYRINGE, 3 ML
EACH MISCELLANEOUS
Qty: 1 KIT | Refills: 0 | Status: SHIPPED | OUTPATIENT
Start: 2021-04-14 | End: 2021-10-16

## 2021-04-14 NOTE — PROGRESS NOTES
Subjective:   Holly Zacarias is a 37 y.o. female who was seen for No chief complaint on file. HPI   Here for follow up for sleeping and mind racing. Noted issues with bp being high. Concern about interactions. Home Medications    Medication Sig Start Date End Date Taking? Authorizing Provider   amLODIPine (NORVASC) 5 mg tablet Take 2 Tabs by mouth daily. 3/3/21   Brinda Pérez MD   hydrOXYzine HCL (ATARAX) 50 mg tablet Take 50 mg by mouth three (3) times daily as needed. Other, MD [place]   olmesartan (BENICAR) 40 mg tablet TAKE 1 TABLET BY MOUTH DAILY 2/26/21   Sanford Lock NP   metFORMIN ER (GLUCOPHAGE XR) 500 mg tablet Take 1 Tab by mouth two (2) times a day. 1/22/21   Brinda Pérez MD   lovastatin (MEVACOR) 10 mg tablet TAKE 1 TABLET BY MOUTH EVERY NIGHT AT BEDTIME  Indications: high cholesterol and high triglycerides 12/15/20   Christinasommer Lock, NP   ARIPiprazole (ABILIFY) 10 mg tablet Take 10 mg by mouth daily. Provider, Historical   BUPROPION HCL PO Take 200 mg by mouth once. Provider, Historical   levothyroxine (SYNTHROID) 50 mcg tablet Take  by mouth Daily (before breakfast). Provider, Historical   levonorgestreL (Mirena) 20 mcg/24 hours (5 yrs) 52 mg IUD 1 Device by IntraUTERine route once. Provider, Historical   montelukast (SINGULAIR) 10 mg tablet Take 10 mg by mouth daily. Provider, Historical   cholecalciferol (Vitamin D3) (5000 Units/125 mcg) tab tablet Take  by mouth daily.     Provider, Historical      Allergies   Allergen Reactions    Codeine Shortness of Breath    Flexeril [Cyclobenzaprine] Unknown (comments)    [place] Unknown (comments)     Social History     Tobacco Use    Smoking status: Never Smoker    Smokeless tobacco: Never Used   Substance Use Topics    Alcohol use: No    Drug use: No            Review of Systems some headache    Physical Exam exam is regular rate and rhythm normal neurological exam.  Objective:     Visit Vitals  BP (!) 160/90   Pulse 75      alert, cooperative, no distress   normal mood, behavior, speech, dress, motor activity, and thought processes, able to follow commands          Assessment & Plan:     1. Fatigue due to depression  Patient will continue to improve her sleep as well as continue on her current medications for depression. 2. Essential hypertension  We will switch off of her olmesartan as start her on lisinopril as well as metoprolol. 712    Additional exam findings: We discussed the expected course, resolution and complications of the diagnosis(es) in detail. Medication risks, benefits, costs, interactions, and alternatives were discussed as indicated. I advised her to contact the office if her condition worsens, changes or fails to improve as anticipated. She expressed understanding with the diagnosis(es) and plan.

## 2021-05-13 ENCOUNTER — OFFICE VISIT (OUTPATIENT)
Dept: FAMILY MEDICINE CLINIC | Age: 36
End: 2021-05-13
Payer: MEDICAID

## 2021-05-13 VITALS
HEIGHT: 66 IN | RESPIRATION RATE: 18 BRPM | TEMPERATURE: 97.5 F | BODY MASS INDEX: 41.62 KG/M2 | HEART RATE: 81 BPM | WEIGHT: 259 LBS | DIASTOLIC BLOOD PRESSURE: 89 MMHG | SYSTOLIC BLOOD PRESSURE: 138 MMHG

## 2021-05-13 DIAGNOSIS — F32.A FATIGUE DUE TO DEPRESSION: ICD-10-CM

## 2021-05-13 DIAGNOSIS — I10 ESSENTIAL HYPERTENSION: Primary | ICD-10-CM

## 2021-05-13 DIAGNOSIS — R53.83 FATIGUE DUE TO DEPRESSION: ICD-10-CM

## 2021-05-13 PROCEDURE — 99213 OFFICE O/P EST LOW 20 MIN: CPT | Performed by: INTERNAL MEDICINE

## 2021-05-13 NOTE — PROGRESS NOTES
Subjective:   Julia Ogden is a 37 y.o. female who was seen for Follow-up (4 week f/u)    HPI   Patient returns for follow-up of her blood pressure and anxiety. She is doing much better with her anxiety. Blood pressure is coming down and she is tolerating medications very well. Patient has had no side effects from the medications and energy level stayed good. Last thyroid measurement was done last fall with her endocrinologist.  Otherwise patient has no other complaints and is doing well. Home Medications    Medication Sig Start Date End Date Taking? Authorizing Provider   lisinopril-hydroCHLOROthiazide (PRINZIDE, ZESTORETIC) 20-25 mg per tablet Take 1 Tab by mouth daily. 4/14/21  Yes Inna Valladares MD   metoprolol succinate (TOPROL-XL) 25 mg XL tablet Take 1 Tab by mouth daily. 4/14/21  Yes Inna Valladares MD   Blood-Glucose Meter monitoring kit Use daily for glucose monitoring 4/14/21  Yes Inna Valladares MD   amLODIPine (NORVASC) 5 mg tablet Take 2 Tabs by mouth daily. 3/3/21  Yes Inna Valladares MD   metFORMIN ER (GLUCOPHAGE XR) 500 mg tablet Take 1 Tab by mouth two (2) times a day. 1/22/21  Yes Inna Valladares MD   lovastatin (MEVACOR) 10 mg tablet TAKE 1 TABLET BY MOUTH EVERY NIGHT AT BEDTIME  Indications: high cholesterol and high triglycerides 12/15/20  Yes Mare Swan NP   ARIPiprazole (ABILIFY) 10 mg tablet Take 10 mg by mouth daily. Yes Provider, Historical   BUPROPION HCL PO Take 200 mg by mouth once. Yes Provider, Historical   levothyroxine (SYNTHROID) 50 mcg tablet Take  by mouth Daily (before breakfast). Yes Provider, Historical   cholecalciferol (Vitamin D3) (5000 Units/125 mcg) tab tablet Take  by mouth daily. Yes Provider, Historical   hydrOXYzine HCL (ATARAX) 50 mg tablet Take 50 mg by mouth three (3) times daily as needed. Other, MD [place]   levonorgestreL (Mirena) 20 mcg/24 hours (5 yrs) 52 mg IUD 1 Device by IntraUTERine route once.     Provider, Historical   montelukast (SINGULAIR) 10 mg tablet Take 10 mg by mouth daily. Provider, Historical      Allergies   Allergen Reactions    Codeine Shortness of Breath    Flexeril [Cyclobenzaprine] Unknown (comments)    [place] Unknown (comments)     Social History     Tobacco Use    Smoking status: Never Smoker    Smokeless tobacco: Never Used   Substance Use Topics    Alcohol use: No    Drug use: No            Review of Systems   Negative  Physical Exam heart rate is regular and in good rhythm normal rate. Patient neurologically is intact  Objective:     Visit Vitals  /89 (BP 1 Location: Left upper arm, BP Patient Position: Sitting)   Pulse 81   Temp 97.5 °F (36.4 °C)   Resp 18   Ht 5' 6\" (1.676 m)   Wt 259 lb (117.5 kg)   BMI 41.80 kg/m²      alert, cooperative, no distress   normal mood, behavior, speech, dress, motor activity, and thought processes, able to follow commands          Assessment & Plan:     1. Essential hypertension  Patient's blood pressure is much better controlled today. She will continue on her metoprolol as well as her lisinopril. 2. Fatigue due to depression  Currently she is stable on her current medications we will continue her on her Abilify as well as metoprolol. Patient will follow up as needed. 712    Additional exam findings: We discussed the expected course, resolution and complications of the diagnosis(es) in detail. Medication risks, benefits, costs, interactions, and alternatives were discussed as indicated. I advised her to contact the office if her condition worsens, changes or fails to improve as anticipated. She expressed understanding with the diagnosis(es) and plan.

## 2021-05-13 NOTE — PROGRESS NOTES
Norma Last presents today for   Chief Complaint   Patient presents with    Follow-up     4 week f/u       Is someone accompanying this pt? No    Is the patient using any DME equipment during OV? No    Depression Screening:  3 most recent PHQ Screens 12/15/2020   Little interest or pleasure in doing things Not at all   Feeling down, depressed, irritable, or hopeless Not at all   Total Score PHQ 2 0   Trouble falling or staying asleep, or sleeping too much -   Feeling tired or having little energy -   Poor appetite, weight loss, or overeating -   Feeling bad about yourself - or that you are a failure or have let yourself or your family down -   Trouble concentrating on things such as school, work, reading, or watching TV -   Moving or speaking so slowly that other people could have noticed; or the opposite being so fidgety that others notice -   Thoughts of being better off dead, or hurting yourself in some way -   PHQ 9 Score -       Learning Assessment:  Learning Assessment 1/14/2021   PRIMARY LEARNER Patient   HIGHEST LEVEL OF EDUCATION - PRIMARY LEARNER  -   BARRIERS PRIMARY LEARNER -   CO-LEARNER CAREGIVER -   PRIMARY LANGUAGE ENGLISH   LEARNER PREFERENCE PRIMARY VIDEOS   ANSWERED BY patient   RELATIONSHIP SELF       Fall Risk  Fall Risk Assessment, last 12 mths 12/15/2020   Able to walk? Yes   Fall in past 12 months?  No       ADL  ADL Assessment 12/15/2020   Feeding yourself No Help Needed   Getting from bed to chair No Help Needed   Getting dressed No Help Needed   Bathing or showering No Help Needed   Walk across the room (includes cane/walker) No Help Needed   Using the telphone No Help Needed   Taking your medications No Help Needed   Preparing meals No Help Needed   Managing money (expenses/bills) No Help Needed   Moderately strenuous housework (laundry) No Help Needed   Shopping for personal items (toiletries/medicines) No Help Needed   Shopping for groceries No Help Needed   Driving No Help Needed   Climbing a flight of stairs No Help Needed   Getting to places beyond walking distances No Help Needed       Health Maintenance reviewed and discussed and ordered per Provider. Health Maintenance Due   Topic Date Due    Hepatitis C Screening  Never done    COVID-19 Vaccine (1) Never done    PAP AKA CERVICAL CYTOLOGY  09/11/2016    A1C test (Diabetic or Prediabetic)  09/14/2017    Lipid Screen  09/14/2017   . Coordination of Care:  1. Have you been to the ER, urgent care clinic since your last visit? Hospitalized since your last visit? No    2. Have you seen or consulted any other health care providers outside of the [address] [place] [address] [place] since your last visit? Include any pap smears or colon screening.    Yes, [facility]

## 2021-05-24 ENCOUNTER — VIRTUAL VISIT (OUTPATIENT)
Dept: FAMILY MEDICINE CLINIC | Age: 36
End: 2021-05-24
Payer: MEDICAID

## 2021-05-24 DIAGNOSIS — J06.9 VIRAL UPPER RESPIRATORY TRACT INFECTION: Primary | ICD-10-CM

## 2021-05-24 PROCEDURE — 99213 OFFICE O/P EST LOW 20 MIN: CPT | Performed by: INTERNAL MEDICINE

## 2021-05-24 NOTE — PROGRESS NOTES
Karel Yougn presents today for   Chief Complaint   Patient presents with    Cough     Persistent dry cough, been about 6 days. Reaction to covid vaccine       Virtual/telephone visit    Depression Screening:  3 most recent PHQ Screens 5/24/2021   Little interest or pleasure in doing things Not at all   Feeling down, depressed, irritable, or hopeless Not at all   Total Score PHQ 2 0   Trouble falling or staying asleep, or sleeping too much -   Feeling tired or having little energy -   Poor appetite, weight loss, or overeating -   Feeling bad about yourself - or that you are a failure or have let yourself or your family down -   Trouble concentrating on things such as school, work, reading, or watching TV -   Moving or speaking so slowly that other people could have noticed; or the opposite being so fidgety that others notice -   Thoughts of being better off dead, or hurting yourself in some way -   PHQ 9 Score -       Learning Assessment:  Learning Assessment 1/14/2021   PRIMARY LEARNER Patient   HIGHEST LEVEL OF EDUCATION - PRIMARY LEARNER  -   BARRIERS PRIMARY LEARNER -   CO-LEARNER CAREGIVER -   PRIMARY LANGUAGE ENGLISH   LEARNER PREFERENCE PRIMARY VIDEOS   ANSWERED BY patient   RELATIONSHIP SELF       Fall Risk  Fall Risk Assessment, last 12 mths 12/15/2020   Able to walk? Yes   Fall in past 12 months?  No       ADL  ADL Assessment 12/15/2020   Feeding yourself No Help Needed   Getting from bed to chair No Help Needed   Getting dressed No Help Needed   Bathing or showering No Help Needed   Walk across the room (includes cane/walker) No Help Needed   Using the telphone No Help Needed   Taking your medications No Help Needed   Preparing meals No Help Needed   Managing money (expenses/bills) No Help Needed   Moderately strenuous housework (laundry) No Help Needed   Shopping for personal items (toiletries/medicines) No Help Needed   Shopping for groceries No Help Needed   Driving No Help Needed   Climbing a flight of stairs No Help Needed   Getting to places beyond walking distances No Help Needed       Health Maintenance reviewed and discussed and ordered per Provider. Health Maintenance Due   Topic Date Due    Hepatitis C Screening  Never done    COVID-19 Vaccine (1) Never done    PAP AKA CERVICAL CYTOLOGY  09/11/2016    A1C test (Diabetic or Prediabetic)  09/14/2017    Lipid Screen  09/14/2017   . Coordination of Care:  1. Have you been to the ER, urgent care clinic since your last visit? Hospitalized since your last visit? No    2. Have you seen or consulted any other health care providers outside of the [address] [place] [address] [place] since your last visit? Include any pap smears or colon screening.    Yes, [place] guan

## 2021-05-24 NOTE — PROGRESS NOTES
Karen Lopez (: 1985) is a 37 y.o. female, established patient, here for evaluation of the following chief complaint(s):   Cough (Persistent dry cough, been about 6 days. Reaction to covid vaccine)     Cough since last week, rhinorrhea, sore throat at the beginning. Congested, no fever currently. No rash no diarrhea., no smelling   ASSESSMENT/PLAN:  Below is the assessment and plan developed based on review of pertinent labs, studies, and medications. 1. Viral upper respiratory tract infection  Sounds like she has a simple viral illness it could be Covid but with no further fever shortness of breath or other concerns I think we can just observe this. She has been tested 3 times already and has been negative in the past several months for Covid. Gets her second Covid shot in about 2 weeks. We discussed using some over-the-counter Delsym to help her cough as well as remain protected with mask covering for the next several weeks. No follow-ups on file. SUBJECTIVE/OBJECTIVE:  HPI    Review of Systems     No flowsheet data found. Physical Exam    Normal hearing normal speech normal mentation and cognition        Karen Lopez, was evaluated through a synchronous (real-time) audio-video encounter. The patient (or guardian if applicable) is aware that this is a billable service. Verbal consent to proceed has been obtained within the past 12 months. The visit was conducted pursuant to the emergency declaration under the [address] [place] [address], [address] [place] [address] authority and the [employer] and [employer] Act. Patient identification was verified, and a caregiver was present when appropriate. The patient was located in a state where the provider was credentialed to provide care. An electronic signature was used to authenticate this note.   -- Amadeo Dudley MD     Subjective:

## 2021-05-27 ENCOUNTER — TELEPHONE (OUTPATIENT)
Dept: FAMILY MEDICINE CLINIC | Age: 36
End: 2021-05-27

## 2021-05-27 NOTE — TELEPHONE ENCOUNTER
Patient called office; she said she had a virtual with Dr. Mikal Lux on Monday 5/24, and was told to call if her cough has not improved.  She said if someone could please call her back at [phone]

## 2021-06-01 ENCOUNTER — TELEPHONE (OUTPATIENT)
Dept: FAMILY MEDICINE CLINIC | Age: 36
End: 2021-06-01

## 2021-06-01 DIAGNOSIS — J06.9 VIRAL UPPER RESPIRATORY TRACT INFECTION: Primary | ICD-10-CM

## 2021-06-02 RX ORDER — BENZONATATE 200 MG/1
200 CAPSULE ORAL
Qty: 21 CAPSULE | Refills: 1 | Status: SHIPPED | OUTPATIENT
Start: 2021-06-02 | End: 2021-06-09

## 2021-06-02 NOTE — TELEPHONE ENCOUNTER
Called patient and let her know Dr. Ruddy Francois called in a new prescription. Patient verbalized understanding.

## 2021-06-23 ENCOUNTER — VIRTUAL VISIT (OUTPATIENT)
Dept: FAMILY MEDICINE CLINIC | Age: 36
End: 2021-06-23
Payer: MEDICAID

## 2021-06-23 DIAGNOSIS — R05.3 PERSISTENT COUGH: Primary | ICD-10-CM

## 2021-06-23 PROCEDURE — 99214 OFFICE O/P EST MOD 30 MIN: CPT | Performed by: INTERNAL MEDICINE

## 2021-06-23 RX ORDER — LEVOCETIRIZINE DIHYDROCHLORIDE 5 MG/1
5 TABLET, FILM COATED ORAL DAILY
Qty: 90 TABLET | Refills: 1 | Status: SHIPPED | OUTPATIENT
Start: 2021-06-23 | End: 2021-10-16

## 2021-06-23 RX ORDER — AZITHROMYCIN 250 MG/1
TABLET, FILM COATED ORAL
Qty: 6 TABLET | Refills: 0 | Status: SHIPPED | OUTPATIENT
Start: 2021-06-23 | End: 2021-06-28

## 2021-06-23 RX ORDER — LEVOCETIRIZINE DIHYDROCHLORIDE 5 MG/1
5 TABLET, FILM COATED ORAL DAILY
Qty: 30 TABLET | Refills: 1 | Status: SHIPPED | OUTPATIENT
Start: 2021-06-23 | End: 2021-06-23

## 2021-06-23 NOTE — PROGRESS NOTES
Tennis Stalls (: 1985) is a 37 y.o. female, established patient, here for evaluation of the following chief complaint(s):   No chief complaint on file. Patient comes in for persistent cough now for over a month. She has been concerned that it could be reflux or pertussis or tuberculosis. She denies any fever but does have some mild amount of mucus. Denies any nasal congestion or mucus. He has no other new exposures no evidence of increased allergy to anything particular that she is aware of. Cough does not seem to wake her during the night. And she denies any actual reflux symptoms. ASSESSMENT/PLAN:  Below is the assessment and plan developed based on review of pertinent labs, studies, and medications. 1. Persistent cough  Cause of her cough could be several different etiologies. Have a persistent bronchitis we will go ahead and try some other azithromycin for that. Allergies are also a possibility and we will start her on some Xyzal to see if this improves. In addition we will test her for TB as well as pertussis. Additionally might need a chest x-ray or further work-up for possible reflux.  - QUANTIFERON-TB PLUS(CLIENT INCUB. )  - B PERTUSSIS AB, IGG, IGM, IGA    No follow-ups on file. SUBJECTIVE/OBJECTIVE:  HPI    Review of Systems   Negative for any chest pain any shortness of breath. No flowsheet data found. Physical Exam    She has normal speech she does not cough during our discussion. Hearing is normal.        Maddie García, was evaluated through a synchronous (real-time) audio-video encounter. The patient (or guardian if applicable) is aware that this is a billable service. Verbal consent to proceed has been obtained within the past 12 months. The visit was conducted pursuant to the emergency declaration under the [facility], [address] [place] [address] authority and the [employer] and [employer] Act.   Patient identification was verified, and a caregiver was present when appropriate. The patient was located in a state where the provider was credentialed to provide care. An electronic signature was used to authenticate this note.   -- Amadeo Dudley MD

## 2021-06-28 ENCOUNTER — HOSPITAL ENCOUNTER (OUTPATIENT)
Dept: LAB | Age: 36
Discharge: HOME | End: 2021-06-28

## 2021-06-28 PROCEDURE — 99001 SPECIMEN HANDLING PT-LAB: CPT

## 2021-07-02 ENCOUNTER — TELEPHONE (OUTPATIENT)
Dept: FAMILY MEDICINE CLINIC | Age: 36
End: 2021-07-02

## 2021-07-02 DIAGNOSIS — R05.3 PERSISTENT COUGH: Primary | ICD-10-CM

## 2021-07-02 NOTE — TELEPHONE ENCOUNTER
When I called the patient to tell her, her labs were normal she asked what does she do next to find out why she has a cough?     Please advise

## 2021-07-15 ENCOUNTER — APPOINTMENT (OUTPATIENT)
Dept: GENERAL RADIOLOGY | Age: 36
End: 2021-07-15
Attending: INTERNAL MEDICINE
Payer: MEDICAID

## 2021-07-15 ENCOUNTER — HOSPITAL ENCOUNTER (EMERGENCY)
Age: 36
Discharge: HOME | End: 2021-07-15
Attending: INTERNAL MEDICINE
Payer: MEDICAID

## 2021-07-15 VITALS
WEIGHT: 245 LBS | DIASTOLIC BLOOD PRESSURE: 83 MMHG | RESPIRATION RATE: 17 BRPM | SYSTOLIC BLOOD PRESSURE: 150 MMHG | HEART RATE: 81 BPM | BODY MASS INDEX: 38.45 KG/M2 | TEMPERATURE: 98.6 F | OXYGEN SATURATION: 100 % | HEIGHT: 67 IN

## 2021-07-15 DIAGNOSIS — R05.9 COUGH: Primary | ICD-10-CM

## 2021-07-15 DIAGNOSIS — J45.30 MILD PERSISTENT REACTIVE AIRWAY DISEASE WITHOUT COMPLICATION: ICD-10-CM

## 2021-07-15 DIAGNOSIS — M79.18 MYOFASCIAL MUSCLE PAIN: ICD-10-CM

## 2021-07-15 LAB
COVID-19 RAPID TEST, COVR: NOT DETECTED
SPECIMEN SOURCE: NORMAL

## 2021-07-15 PROCEDURE — 74011000250 HC RX REV CODE- 250: Performed by: INTERNAL MEDICINE

## 2021-07-15 PROCEDURE — 87635 SARS-COV-2 COVID-19 AMP PRB: CPT

## 2021-07-15 PROCEDURE — 99281 EMR DPT VST MAYX REQ PHY/QHP: CPT

## 2021-07-15 PROCEDURE — 94640 AIRWAY INHALATION TREATMENT: CPT

## 2021-07-15 PROCEDURE — 71046 X-RAY EXAM CHEST 2 VIEWS: CPT

## 2021-07-15 RX ORDER — LIDOCAINE 4 G/100G
PATCH TOPICAL
Qty: 30 PATCH | Refills: 0 | Status: SHIPPED | OUTPATIENT
Start: 2021-07-15 | End: 2021-10-16

## 2021-07-15 RX ORDER — ALBUTEROL SULFATE 90 UG/1
2 AEROSOL, METERED RESPIRATORY (INHALATION)
Qty: 1 INHALER | Refills: 0 | Status: SHIPPED | OUTPATIENT
Start: 2021-07-15

## 2021-07-15 RX ORDER — IPRATROPIUM BROMIDE AND ALBUTEROL SULFATE 2.5; .5 MG/3ML; MG/3ML
3 SOLUTION RESPIRATORY (INHALATION)
Status: COMPLETED | OUTPATIENT
Start: 2021-07-15 | End: 2021-07-15

## 2021-07-15 RX ORDER — BUPROPION HYDROCHLORIDE 200 MG/1
1 TABLET, EXTENDED RELEASE ORAL DAILY
COMMUNITY
Start: 2021-05-13

## 2021-07-15 RX ADMIN — IPRATROPIUM BROMIDE AND ALBUTEROL SULFATE 3 ML: .5; 3 SOLUTION RESPIRATORY (INHALATION) at 11:37

## 2021-07-15 NOTE — ED TRIAGE NOTES
Pt reports she has had a cough for two months now, states she was seen by her pcp and all testing done came back as normal. States every time she coughs her back spasms and is painful. Reports she has follow up with a pulmonologist scheduled d/t testing coming back negative but continues with a cough.

## 2021-07-15 NOTE — ED PROVIDER NOTES
EMERGENCY DEPARTMENT HISTORY AND PHYSICAL EXAM      Date: 7/15/2021  Patient Name: Marcus Florence      History of Presenting Illness     Chief Complaint   Patient presents with    Muscle Pain       History Provided By: Patient    HPI: Marcus Florence, 37 y.o. female with a past medical history significant for panic attacks; depression; bipolar; GERD; HTN; HLD, hypothyroid; DM that presents to the ED with cc of chronic cough. Pt states that after her 1st covid vaccine, she developed a dry cough and has had it ever since. Her PCP; Dr Juli eKane, did whooping cough testing; covid and tb testing 3 weeks ago which were negative and referred her to a pulmonologist but she is not able to be seen until 9/30/21. States that the cough occurs all day long; non productive; no fever/chills/ hemoptysis. Nonsmoker. The cough causes her pain in the bilateral infrascapular area. There are no other complaints, changes, or physical findings at this time. PCP: Iban Owusu MD    Current Outpatient Medications   Medication Sig Dispense Refill    buPROPion SR (WELLBUTRIN, ZYBAN) 200 mg SR tablet Take 1 Tablet by mouth daily.  albuterol (PROVENTIL HFA, VENTOLIN HFA, PROAIR HFA) 90 mcg/actuation inhaler Take 2 Puffs by inhalation every six (6) hours as needed for Wheezing. 1 Inhaler 0    lidocaine 4 % patch Apply one to painful area twice daily 30 Patch 0    levocetirizine (XYZAL) 5 mg tablet TAKE 1 TABLET BY MOUTH DAILY 90 Tablet 1    lisinopril-hydroCHLOROthiazide (PRINZIDE, ZESTORETIC) 20-25 mg per tablet Take 1 Tab by mouth daily. 90 Tab 2    metoprolol succinate (TOPROL-XL) 25 mg XL tablet Take 1 Tab by mouth daily. 90 Tab 2    metFORMIN ER (GLUCOPHAGE XR) 500 mg tablet Take 1 Tab by mouth two (2) times a day.  60 Tab 2    lovastatin (MEVACOR) 10 mg tablet TAKE 1 TABLET BY MOUTH EVERY NIGHT AT BEDTIME  Indications: high cholesterol and high triglycerides 90 Tab 1    ARIPiprazole (ABILIFY) 10 mg tablet Take 10 mg by mouth daily.  levothyroxine (SYNTHROID) 50 mcg tablet Take  by mouth Daily (before breakfast).  cholecalciferol (Vitamin D3) (5000 Units/125 mcg) tab tablet Take  by mouth daily.  Blood-Glucose Meter monitoring kit Use daily for glucose monitoring 1 Kit 0       Past History     Past Medical History:  Past Medical History:   Diagnosis Date    Anxiety     Bipolar II disorder ([address] 6/26/2018    Depression     Essential hypertension 1/30/2020    Fatigue 7/28/2020    Gastroesophageal reflux disease without esophagitis 1/30/2020    Goiter 6/29/2020    Hyperlipidemia 1/30/2020    Hyperthyroidism 1/3/2019    Hypothyroidism 9/5/2019    Migraine 7/28/2020    Panic attack     Persistent cough 1/30/2020    Postpartum depression 7/28/2020    Prediabetes 6/29/2020    Psychiatric disorder     depression with SI   Migraines     Sleep disorder     Suicidal thoughts        Past Surgical History:  Past Surgical History:   Procedure Laterality Date    HX GYN      2 c-sections       Family History:  Family History   Problem Relation Age of Onset    Diabetes Paternal Aunt     Diabetes Maternal Grandmother     Hypertension Maternal Grandmother     Heart Disease Mother     Diabetes Mother     Diabetes Father        Social History:  Social History     Tobacco Use    Smoking status: Never Smoker    Smokeless tobacco: Never Used   Vaping Use    Vaping Use: Never used   Substance Use Topics    Alcohol use: No    Drug use: No       Allergies: Allergies   Allergen Reactions    Codeine Shortness of Breath    Flexeril [Cyclobenzaprine] Unknown (comments)    [place] Unknown (comments)         Review of Systems     Review of Systems   Constitutional: Negative for chills and fever. HENT: Negative for sore throat. Eyes: Negative for redness. Respiratory: Positive for cough. Negative for choking, chest tightness, wheezing and stridor.     Cardiovascular: Negative for chest pain and leg swelling. Gastrointestinal: Negative for abdominal distention, diarrhea, nausea and vomiting. Genitourinary: Negative for dysuria and flank pain. Musculoskeletal: Positive for back pain. Negative for arthralgias and joint swelling. Skin: Negative for rash. Neurological: Negative for weakness and headaches. Psychiatric/Behavioral: Negative for agitation and behavioral problems. Physical Exam     Physical Exam  Constitutional:       General: She is not in acute distress. Appearance: Normal appearance. She is obese. She is not ill-appearing, toxic-appearing or diaphoretic. HENT:      Mouth/Throat:      Pharynx: Oropharynx is clear. Eyes:      Extraocular Movements: Extraocular movements intact. Pupils: Pupils are equal, round, and reactive to light. Cardiovascular:      Rate and Rhythm: Normal rate and regular rhythm. Pulses: Normal pulses. Heart sounds: Normal heart sounds. Pulmonary:      Effort: Pulmonary effort is normal.      Comments: occ mild exp wheeze  Abdominal:      General: There is no distension. Musculoskeletal:         General: No swelling. Cervical back: Neck supple. Right lower leg: No edema. Left lower leg: No edema. Comments: Mild TTP in the bilat infrascapular areas w/o rash   Skin:     General: Skin is warm and dry. Capillary Refill: Capillary refill takes less than 2 seconds. Neurological:      Mental Status: She is oriented to person, place, and time.    Psychiatric:         Behavior: Behavior normal.         Lab and Diagnostic Study Results     Labs -     Recent Results (from the past 12 hour(s))   COVID-19 RAPID TEST    Collection Time: 07/15/21 12:05 PM   Result Value Ref Range    Specimen source Nasopharyngeal      COVID-19 rapid test Not Detected Not Detected         Radiologic Studies -   [unfilled]  CT Results  (Last 48 hours)    None        CXR Results  (Last 48 hours)               07/15/21 1156  XR CHEST PA LAT Final result    Impression:      1. No acute cardiopulmonary disease. Narrative:  EXAM:  PA AND LATERAL CHEST       INDICATION:  Cough. COMPARISON:  Portable exam at 03/01/2021       TECHNIQUE:  PA and lateral views.       ________________________________       FINDINGS:       HEART AND MEDIASTINUM: Cardiac silhouette is within normal limits. Normal   caliber thoracic aorta. LUNGS AND PLEURAL SPACES: Lungs are well aerated with no confluent airspace   opacity. No pleural effusion or pneumothorax. BONY THORAX AND SOFT TISSUES: No acute osseous abnormality. ________________________________             CXR NEGATIVE    Medical Decision Making and ED Course   - I am the first and primary provider for this patient AND AM THE PRIMARY PROVIDER OF RECORD. - I reviewed the vital signs, available nursing notes, past medical history, past surgical history, family history and social history. - Initial assessment performed. The patients presenting problems have been discussed, and the staff are in agreement with the care plan formulated and outlined with them. I have encouraged them to ask questions as they arise throughout their visit. Vital Signs-Reviewed the patient's vital signs. Patient Vitals for the past 12 hrs:   Temp Pulse Resp BP SpO2   07/15/21 1137     100 %   07/15/21 1107 98.6 °F (37 °C) 81 17 (!) 150/83 99 %       Records Reviewed: Nursing Notes      ED Course:   Chronic cough seems to be reactive airway with rare exp wheeze; could be covid; unlikely pneumonia or gerd; will give duoneb trial; check covid             Consultations:       Consultations:         Procedures and Critical Care         Disposition     Disposition:     Discharged     Remove if not discharged  DISCHARGE PLAN:  1.    Current Discharge Medication List      CONTINUE these medications which have NOT CHANGED    Details   buPROPion SR (WELLBUTRIN, ZYBAN) 200 mg SR tablet Take 1 Tablet by mouth daily.      levocetirizine (XYZAL) 5 mg tablet TAKE 1 TABLET BY MOUTH DAILY  Qty: 90 Tablet, Refills: 1    Comments: **Patient requests 90 days supply**      lisinopril-hydroCHLOROthiazide (PRINZIDE, ZESTORETIC) 20-25 mg per tablet Take 1 Tab by mouth daily. Qty: 90 Tab, Refills: 2      metoprolol succinate (TOPROL-XL) 25 mg XL tablet Take 1 Tab by mouth daily. Qty: 90 Tab, Refills: 2      metFORMIN ER (GLUCOPHAGE XR) 500 mg tablet Take 1 Tab by mouth two (2) times a day. Qty: 60 Tab, Refills: 2      lovastatin (MEVACOR) 10 mg tablet TAKE 1 TABLET BY MOUTH EVERY NIGHT AT BEDTIME  Indications: high cholesterol and high triglycerides  Qty: 90 Tab, Refills: 1    Associated Diagnoses: Mixed hyperlipidemia      ARIPiprazole (ABILIFY) 10 mg tablet Take 10 mg by mouth daily. levothyroxine (SYNTHROID) 50 mcg tablet Take  by mouth Daily (before breakfast). cholecalciferol (Vitamin D3) (5000 Units/125 mcg) tab tablet Take  by mouth daily. Blood-Glucose Meter monitoring kit Use daily for glucose monitoring  Qty: 1 Kit, Refills: 0           2. Follow-up Information     Follow up With Specialties Details Why Contact Info    Gualberto Carpio MD Internal Medicine Schedule an appointment as soon as possible for a visit in 1 week  [facility] [address] [place] [address]  [phone]          3. Return to ED if worse   4. Current Discharge Medication List      START taking these medications    Details   albuterol (PROVENTIL HFA, VENTOLIN HFA, PROAIR HFA) 90 mcg/actuation inhaler Take 2 Puffs by inhalation every six (6) hours as needed for Wheezing. Qty: 1 Inhaler, Refills: 0  Start date: 7/15/2021      lidocaine 4 % patch Apply one to painful area twice daily  Qty: 30 Patch, Refills: 0  Start date: 7/15/2021             Diagnosis     Clinical Impression:   1. Cough    2. Mild persistent reactive airway disease without complication    3.  Myofascial muscle pain        Attestations:    Chet Rodriguez Barbie Nicholas MD    Please note that this dictation was completed with [employer], the computer voice recognition software. Quite often unanticipated grammatical, syntax, homophones, and other interpretive errors are inadvertently transcribed by the computer software. Please disregard these errors. Please excuse any errors that have escaped final proofreading. Thank you.

## 2021-10-07 ENCOUNTER — DOCUMENTATION ONLY (OUTPATIENT)
Dept: PULMONOLOGY | Age: 36
End: 2021-10-07

## 2021-10-07 NOTE — PROGRESS NOTES
Attempted to reach pt to rs new patient appt. Voicemail not set up, unable to leave message. NP Appt Dr. Shavonne Joseph for persistent cough. CXR 7/15/21. Pt cx 9/30/21 appt.

## 2021-10-16 ENCOUNTER — HOSPITAL ENCOUNTER (EMERGENCY)
Age: 36
Discharge: HOME | End: 2021-10-16
Attending: INTERNAL MEDICINE
Payer: MEDICAID

## 2021-10-16 VITALS
OXYGEN SATURATION: 100 % | TEMPERATURE: 98.2 F | HEIGHT: 67 IN | BODY MASS INDEX: 38.45 KG/M2 | SYSTOLIC BLOOD PRESSURE: 140 MMHG | WEIGHT: 245 LBS | RESPIRATION RATE: 16 BRPM | DIASTOLIC BLOOD PRESSURE: 78 MMHG | HEART RATE: 74 BPM

## 2021-10-16 DIAGNOSIS — R42 VERTIGO: Primary | ICD-10-CM

## 2021-10-16 PROCEDURE — 99283 EMERGENCY DEPT VISIT LOW MDM: CPT

## 2021-10-16 PROCEDURE — 74011250636 HC RX REV CODE- 250/636: Performed by: INTERNAL MEDICINE

## 2021-10-16 RX ORDER — MECLIZINE HYDROCHLORIDE 25 MG/1
TABLET ORAL
Qty: 20 TABLET | Refills: 0 | Status: SHIPPED | OUTPATIENT
Start: 2021-10-16 | End: 2022-03-26

## 2021-10-16 RX ORDER — OMEPRAZOLE 40 MG/1
CAPSULE, DELAYED RELEASE ORAL
COMMUNITY
Start: 2021-07-29

## 2021-10-16 RX ORDER — MONTELUKAST SODIUM 10 MG/1
10 TABLET ORAL DAILY
COMMUNITY
Start: 2021-09-03 | End: 2022-03-26

## 2021-10-16 RX ORDER — MECLIZINE HCL 12.5 MG 12.5 MG/1
25 TABLET ORAL
Status: COMPLETED | OUTPATIENT
Start: 2021-10-16 | End: 2021-10-16

## 2021-10-16 RX ADMIN — MECLIZINE 25 MG: 12.5 TABLET ORAL at 14:12

## 2021-10-16 NOTE — ED PROVIDER NOTES
EMERGENCY DEPARTMENT HISTORY AND PHYSICAL EXAM      Date: 10/16/2021  Patient Name: Nadira Adan    History of Presenting Illness     Chief Complaint   Patient presents with    Dizziness       History Provided By: Patient    HPI: Nadira Adan, 31 y.o. female with a past medical history significant for anxiety, panic attack, bipolar, hypertension, GERD, goiter, hyperlipidemia, migraine, prediabetes that presents to the ED with cc of vertigo. Patient states that for the last 1-1/2 months she has developed episodes where the room is spinning around and she has mild disequilibrium. No nausea no headache, no fever no chills, no tinnitus no headaches, no extremity weakness. There are no other complaints, changes, or physical findings at this time. PCP: Luz Elena San MD    No current facility-administered medications on file prior to encounter. Current Outpatient Medications on File Prior to Encounter   Medication Sig Dispense Refill    omeprazole (PRILOSEC) 40 mg capsule TAKE 1 CAPSULE BY MOUTH EVERY DAY 30 MINUTES BEFORE BREAKFAST      montelukast (SINGULAIR) 10 mg tablet Take 10 mg by mouth daily.  buPROPion SR (WELLBUTRIN, ZYBAN) 200 mg SR tablet Take 1 Tablet by mouth daily.  albuterol (PROVENTIL HFA, VENTOLIN HFA, PROAIR HFA) 90 mcg/actuation inhaler Take 2 Puffs by inhalation every six (6) hours as needed for Wheezing. 1 Inhaler 0    lisinopril-hydroCHLOROthiazide (PRINZIDE, ZESTORETIC) 20-25 mg per tablet Take 1 Tab by mouth daily. 90 Tab 2    ARIPiprazole (ABILIFY) 10 mg tablet Take 10 mg by mouth daily.  levothyroxine (SYNTHROID) 50 mcg tablet Take  by mouth Daily (before breakfast).  [DISCONTINUED] lidocaine 4 % patch Apply one to painful area twice daily 30 Patch 0    [DISCONTINUED] levocetirizine (XYZAL) 5 mg tablet TAKE 1 TABLET BY MOUTH DAILY 90 Tablet 1    [DISCONTINUED] metoprolol succinate (TOPROL-XL) 25 mg XL tablet Take 1 Tab by mouth daily.  90 Tab 2  [DISCONTINUED] Blood-Glucose Meter monitoring kit Use daily for glucose monitoring 1 Kit 0    [DISCONTINUED] metFORMIN ER (GLUCOPHAGE XR) 500 mg tablet Take 1 Tab by mouth two (2) times a day. 60 Tab 2    lovastatin (MEVACOR) 10 mg tablet TAKE 1 TABLET BY MOUTH EVERY NIGHT AT BEDTIME  Indications: high cholesterol and high triglycerides 90 Tab 1    [DISCONTINUED] cholecalciferol (Vitamin D3) (5000 Units/125 mcg) tab tablet Take  by mouth daily. Past History     Past Medical History:  Past Medical History:   Diagnosis Date    Anxiety     Bipolar II disorder ([facility] [address] 6/26/2018    Depression     Essential hypertension 1/30/2020    Fatigue 7/28/2020    Gastroesophageal reflux disease without esophagitis 1/30/2020    Goiter 6/29/2020    Hyperlipidemia 1/30/2020    Hyperthyroidism 1/3/2019    Hypothyroidism 9/5/2019    Migraine 7/28/2020    Panic attack     Persistent cough 1/30/2020    Postpartum depression 7/28/2020    Prediabetes 6/29/2020    Psychiatric disorder     depression with SI   Migraines     Sleep disorder     Suicidal thoughts        Past Surgical History:  Past Surgical History:   Procedure Laterality Date    HX GYN      2 c-sections       Family History:  Family History   Problem Relation Age of Onset    Diabetes Paternal Aunt     Diabetes Maternal Grandmother     Hypertension Maternal Grandmother     Heart Disease Mother     Diabetes Mother     Diabetes Father        Social History:  Social History     Tobacco Use    Smoking status: Never Smoker    Smokeless tobacco: Never Used   Vaping Use    Vaping Use: Never used   Substance Use Topics    Alcohol use: No    Drug use: No       Allergies: Allergies   Allergen Reactions    Codeine Shortness of Breath    Flexeril [Cyclobenzaprine] Unknown (comments)    [place] Unknown (comments)         Review of Systems     Review of Systems   Constitutional: Negative for chills and fever.    HENT: Negative for trouble swallowing. Eyes: Negative for visual disturbance. Respiratory: Negative for cough and shortness of breath. Cardiovascular: Negative for chest pain. Gastrointestinal: Negative for abdominal pain, nausea and vomiting. Genitourinary: Negative for flank pain. Musculoskeletal: Negative for neck stiffness. Skin: Negative for rash. Neurological: Positive for dizziness. Negative for syncope, speech difficulty, weakness, numbness and headaches. Psychiatric/Behavioral: Negative for confusion. Physical Exam  Vitals and nursing note reviewed. Constitutional:       General: She is not in acute distress. Appearance: She is well-developed. HENT:      Head: Normocephalic. Mouth/Throat:      Pharynx: No oropharyngeal exudate. Eyes:      General:         Right eye: No discharge. Pupils: Pupils are equal, round, and reactive to light. Comments: No nystagmus   Neck:      Thyroid: No thyromegaly. Vascular: No JVD. Cardiovascular:      Rate and Rhythm: Normal rate and regular rhythm. Heart sounds: No murmur heard. No friction rub. No gallop. Pulmonary:      Effort: Pulmonary effort is normal. No respiratory distress. Breath sounds: Normal breath sounds. No wheezing or rales. Chest:      Chest wall: No tenderness. Abdominal:      General: Bowel sounds are normal. There is no distension. Palpations: Abdomen is soft. Tenderness: There is no abdominal tenderness. There is no guarding or rebound. Musculoskeletal:         General: Normal range of motion. Cervical back: Normal range of motion. Skin:     General: Skin is warm. Findings: No erythema. Neurological:      Mental Status: She is alert and oriented to person, place, and time. Cranial Nerves: No cranial nerve deficit. Sensory: No sensory deficit. Motor: No weakness.       Comments: When walking slowly with her eyes closed patient tends to walk to the left Psychiatric:         Behavior: Behavior normal.         Lab and Diagnostic Study Results     Labs -   No results found for this or any previous visit (from the past 12 hour(s)). Radiologic Studies -   @lastxrresult@  CT Results  (Last 48 hours)    None        CXR Results  (Last 48 hours)    None            Medical Decision Making   - I am the first provider for this patient. - I reviewed the vital signs, available nursing notes, past medical history, past surgical history, family history and social history. - Initial assessment performed. The patients presenting problems have been discussed, and they are in agreement with the care plan formulated and outlined with them. I have encouraged them to ask questions as they arise throughout their visit. Vital Signs-Reviewed the patient's vital signs. Patient Vitals for the past 12 hrs:   Temp Pulse Resp BP SpO2   10/16/21 1251 98.2 °F (36.8 °C) 74 16 (!) 140/78 100 %       Records Reviewed: Nursing Notes      ED Course:       Procedures   Med    Disposition   Disposition: Discharged    DISCHARGE PLAN:  1. Current Discharge Medication List      CONTINUE these medications which have NOT CHANGED    Details   omeprazole (PRILOSEC) 40 mg capsule TAKE 1 CAPSULE BY MOUTH EVERY DAY 30 MINUTES BEFORE BREAKFAST      montelukast (SINGULAIR) 10 mg tablet Take 10 mg by mouth daily. buPROPion SR (WELLBUTRIN, ZYBAN) 200 mg SR tablet Take 1 Tablet by mouth daily. albuterol (PROVENTIL HFA, VENTOLIN HFA, PROAIR HFA) 90 mcg/actuation inhaler Take 2 Puffs by inhalation every six (6) hours as needed for Wheezing. Qty: 1 Inhaler, Refills: 0      lisinopril-hydroCHLOROthiazide (PRINZIDE, ZESTORETIC) 20-25 mg per tablet Take 1 Tab by mouth daily. Qty: 90 Tab, Refills: 2      ARIPiprazole (ABILIFY) 10 mg tablet Take 10 mg by mouth daily. levothyroxine (SYNTHROID) 50 mcg tablet Take  by mouth Daily (before breakfast).       lovastatin (MEVACOR) 10 mg tablet TAKE 1 TABLET BY MOUTH EVERY NIGHT AT BEDTIME  Indications: high cholesterol and high triglycerides  Qty: 90 Tab, Refills: 1    Associated Diagnoses: Mixed hyperlipidemia           2. Follow-up Information     Follow up With Specialties Details Why Contact García Bello DO Otolaryngology Schedule an appointment as soon as possible for a visit in 1 week  [address]          3. Return to ED if worse   4. Current Discharge Medication List      START taking these medications    Details   meclizine (ANTIVERT) 25 mg tablet Take 1 tablet by mouth every 6 hours for the next 3 days. Then stop taking the meclizine. Restart the meclizine for 3 day intervals if vertigo/ dizziness returns. Qty: 20 Tablet, Refills: 0  Start date: 10/16/2021               Diagnosis     Clinical Impression:   1. Vertigo        Attestations:    Rogelio Pak MD    Please note that this dictation was completed with [employer], the computer voice recognition software. Quite often unanticipated grammatical, syntax, homophones, and other interpretive errors are inadvertently transcribed by the computer software. Please disregard these errors. Please excuse any errors that have escaped final proofreading. Thank you.

## 2022-02-08 ENCOUNTER — HOSPITAL ENCOUNTER (EMERGENCY)
Age: 37
Discharge: HOME | End: 2022-02-09
Attending: EMERGENCY MEDICINE | Admitting: EMERGENCY MEDICINE
Payer: MEDICAID

## 2022-02-08 DIAGNOSIS — F41.1 ANXIETY STATE: Primary | ICD-10-CM

## 2022-02-08 PROCEDURE — 74011250636 HC RX REV CODE- 250/636: Performed by: EMERGENCY MEDICINE

## 2022-02-08 PROCEDURE — 99284 EMERGENCY DEPT VISIT MOD MDM: CPT

## 2022-02-08 PROCEDURE — 96372 THER/PROPH/DIAG INJ SC/IM: CPT

## 2022-02-08 RX ORDER — LORAZEPAM 2 MG/ML
1 INJECTION INTRAMUSCULAR
Status: DISCONTINUED | OUTPATIENT
Start: 2022-02-08 | End: 2022-02-08

## 2022-02-08 RX ORDER — OLMESARTAN MEDOXOMIL AND HYDROCHLOROTHIAZIDE 40/25 40; 25 MG/1; MG/1
1 TABLET ORAL DAILY
COMMUNITY

## 2022-02-08 RX ORDER — LORAZEPAM 2 MG/ML
1 INJECTION INTRAMUSCULAR
Status: COMPLETED | OUTPATIENT
Start: 2022-02-08 | End: 2022-02-08

## 2022-02-08 RX ADMIN — LORAZEPAM 1 MG: 2 INJECTION INTRAMUSCULAR; INTRAVENOUS at 23:04

## 2022-02-09 VITALS
DIASTOLIC BLOOD PRESSURE: 62 MMHG | TEMPERATURE: 98.3 F | HEART RATE: 77 BPM | OXYGEN SATURATION: 100 % | RESPIRATION RATE: 16 BRPM | SYSTOLIC BLOOD PRESSURE: 120 MMHG

## 2022-02-09 NOTE — ED PROVIDER NOTES
EMERGENCY DEPARTMENT HISTORY AND PHYSICAL EXAM  ?    Date: 2/8/2022  Patient Name: Katya Salciod    History of Presenting Illness    Patient presents with:  Altered mental status      History Provided By: Patient    HPI: Katya Salcido, 31 y.o. female with past medical history significant for bipolar, schizophrenia, anxiety, hypertension presents to the ED with cc of anxiety all day. Patient is not adherent to medications. She is not taking bupropion. There are no other complaints, changes, or physical findings at this time. PCP: Ashwin Hernandez MD    No current facility-administered medications on file prior to encounter. Current Outpatient Medications on File Prior to Encounter:  olmesartan-hydroCHLOROthiazide (BENICAR HCT) 40-25 mg per tablet, Take 1 Tablet by mouth daily. , Disp: , Rfl:   omeprazole (PRILOSEC) 40 mg capsule, TAKE 1 CAPSULE BY MOUTH EVERY DAY 30 MINUTES BEFORE BREAKFAST, Disp: , Rfl:   buPROPion SR (WELLBUTRIN, ZYBAN) 200 mg SR tablet, Take 1 Tablet by mouth daily. , Disp: , Rfl:   ARIPiprazole (ABILIFY) 10 mg tablet, Take 10 mg by mouth daily. , Disp: , Rfl:   levothyroxine (SYNTHROID) 50 mcg tablet, Take  by mouth Daily (before breakfast). , Disp: , Rfl:   montelukast (SINGULAIR) 10 mg tablet, Take 10 mg by mouth daily. , Disp: , Rfl:   meclizine (ANTIVERT) 25 mg tablet, Take 1 tablet by mouth every 6 hours for the next 3 days. Then stop taking the meclizine. Restart the meclizine for 3 day intervals if vertigo/ dizziness returns. , Disp: 20 Tablet, Rfl: 0  albuterol (PROVENTIL HFA, VENTOLIN HFA, PROAIR HFA) 90 mcg/actuation inhaler, Take 2 Puffs by inhalation every six (6) hours as needed for Wheezing., Disp: 1 Inhaler, Rfl: 0  lisinopril-hydroCHLOROthiazide (PRINZIDE, ZESTORETIC) 20-25 mg per tablet, Take 1 Tab by mouth daily. , Disp: 90 Tab, Rfl: 2  lovastatin (MEVACOR) 10 mg tablet, TAKE 1 TABLET BY MOUTH EVERY NIGHT AT BEDTIME  Indications: high cholesterol and high triglycerides, Disp: 90 Tab, Rfl: 1        Past History    Past Medical History:  Past Medical History:  No date: Anxiety  6/26/2018: Bipolar II disorder ([address]  No date: Depression  1/30/2020: Essential hypertension  7/28/2020: Fatigue  1/30/2020: Gastroesophageal reflux disease without esophagitis  6/29/2020: Goiter  1/30/2020: Hyperlipidemia  1/3/2019: Hyperthyroidism  9/5/2019: Hypothyroidism  7/28/2020: Migraine  No date: Panic attack  1/30/2020: Persistent cough  7/28/2020: Postpartum depression  6/29/2020: Prediabetes  No date: Psychiatric disorder     Comment:  depression with SI   Migraines   No date: Sleep disorder  No date: Suicidal thoughts    Past Surgical History:  Past Surgical History:  No date: HX GYN     Comment:  2 c-sections    Family History:  Review of patient's family history indicates:  Problem: Diabetes     Relation: Paternal Marchia Mano         Age of Onset: (Not Specified)  Problem: Diabetes     Relation: Maternal Grandmother         Age of Onset: (Not Specified)  Problem: Hypertension     Relation: Maternal Grandmother         Age of Onset: (Not Specified)  Problem: Heart Disease     Relation: Mother         Age of Onset: (Not Specified)  Problem: Diabetes     Relation: Mother         Age of Onset: (Not Specified)  Problem: Diabetes     Relation: Father         Age of Onset: (Not Specified)      Social History:  Social History   Tobacco Use     Smoking status: Never Smoker     Smokeless tobacco: Never Used   Vaping Use     Vaping Use: Never used   Alcohol use: No   Drug use: No      Allergies:  -- Codeine -- Shortness of Breath  -- Flexeril (Cyclobenzaprine) -- Unknown (comments)  -- Strawberry -- Unknown (comments)      Review of Systems  @[facility]@    Physical Exam  @[facility]@    Diagnostic Study Results    Labs -  No results found for this or any previous visit (from the past 12 hour(s)).     Radiologic Studies -   No orders to display  CT Results  (Last 48 hours)   None     CXR Results (Last 48 hours)   None         Medical Decision Making  I am the first provider for this patient. I reviewed the vital signs, available nursing notes, past medical history, past surgical history, family history and social history. Vital Signs-Reviewed the patient's vital signs. Empty flowsheet group. Records Reviewed: Nursing Notes and Old Medical Records    Provider Notes (Medical Decision Making):   Patient was treated with Ativan. She denies being suicidal or homicidal.    ED Course:   Patient fell asleep. Patient appears calm and not as anxious as prior to medication, but she says she is still anxious. I advised her to take bupropion as instructed. Initial assessment performed. The patients presenting problems have been discussed, and they are in agreement with the care plan formulated and outlined with them. I have encouraged them to ask questions as they arise throughout their visit. PLAN:  1. Current Discharge Medication List      2.  Follow-up Information    None    Return to ED if worse     Diagnosis    Clinical Impression: Anxiety disorder               Past Medical History:   Diagnosis Date    Anxiety     Bipolar II disorder ([facility] [address] 6/26/2018    Depression     Essential hypertension 1/30/2020    Fatigue 7/28/2020    Gastroesophageal reflux disease without esophagitis 1/30/2020    Goiter 6/29/2020    Hyperlipidemia 1/30/2020    Hyperthyroidism 1/3/2019    Hypothyroidism 9/5/2019    Migraine 7/28/2020    Panic attack     Persistent cough 1/30/2020    Postpartum depression 7/28/2020    Prediabetes 6/29/2020    Psychiatric disorder     depression with SI   Migraines     Sleep disorder     Suicidal thoughts        Past Surgical History:   Procedure Laterality Date    HX GYN      2 c-sections         Family History:   Problem Relation Age of Onset    Diabetes Paternal Aunt     Diabetes Maternal Grandmother     Hypertension Maternal Grandmother     Heart Disease Mother     Diabetes Mother     Diabetes Father        Social History     Socioeconomic History    Marital status: SINGLE     Spouse name: Not on file    Number of children: Not on file    Years of education: Not on file    Highest education level: Not on file   Occupational History    Not on file   Tobacco Use    Smoking status: Never Smoker    Smokeless tobacco: Never Used   Vaping Use    Vaping Use: Never used   Substance and Sexual Activity    Alcohol use: No    Drug use: No    Sexual activity: Not Currently     Partners: Male   Other Topics Concern    Not on file   Social History Narrative    Not on file     Social Determinants of Health     Financial Resource Strain:     Difficulty of Paying Living Expenses: Not on file   Food Insecurity:     Worried About Running Out of Food in the Last Year: Not on file    Mike of Food in the Last Year: Not on file   Transportation Needs:     Lack of Transportation (Medical): Not on file    Lack of Transportation (Non-Medical):  Not on file   Physical Activity:     Days of Exercise per Week: Not on file    Minutes of Exercise per Session: Not on file   Stress:     Feeling of Stress : Not on file   Social Connections:     Frequency of Communication with Friends and Family: Not on file    Frequency of Social Gatherings with Friends and Family: Not on file    Attends [employer] Services: Not on file    Active Member of [address] [place] [address] [employer] or Organizations: Not on file    Attends Club or Organization Meetings: Not on file    Marital Status: Not on file   Intimate Partner Violence:     Fear of Current or Ex-Partner: Not on file    Emotionally Abused: Not on file    Physically Abused: Not on file    Sexually Abused: Not on file   Housing Stability:     Unable to Pay for Housing in the Last Year: Not on file    Number of [place] in the Last Year: Not on file    Unstable Housing in the Last Year: Not on file         ALLERGIES: Codeine, Flexeril [cyclobenzaprine], and [place]    Review of Systems   Constitutional: Negative. HENT: Negative. Eyes: Negative. Respiratory: Negative. Cardiovascular: Negative. Gastrointestinal: Negative. Endocrine: Negative. Genitourinary: Negative. Musculoskeletal: Negative. Neurological: Negative. Hematological: Negative. Psychiatric/Behavioral: Negative for suicidal ideas. The patient is nervous/anxious. Vitals:    02/08/22 2147   BP: (!) 148/82   Pulse: 92   Resp: 16   Temp: 98.3 °F (36.8 °C)   SpO2: 99%            Physical Exam  Vitals and nursing note reviewed. Constitutional:       Appearance: Normal appearance. HENT:      Head: Normocephalic and atraumatic. Nose: Nose normal.      Mouth/Throat:      Mouth: Mucous membranes are moist.      Pharynx: Oropharynx is clear. Eyes:      Extraocular Movements: Extraocular movements intact. Conjunctiva/sclera: Conjunctivae normal.      Pupils: Pupils are equal, round, and reactive to light. Cardiovascular:      Rate and Rhythm: Normal rate and regular rhythm. Pulses: Normal pulses. Heart sounds: Normal heart sounds. Pulmonary:      Effort: Pulmonary effort is normal.      Breath sounds: Normal breath sounds. Abdominal:      General: Abdomen is flat. Bowel sounds are normal.      Palpations: Abdomen is soft. Musculoskeletal:         General: Normal range of motion. Cervical back: Normal range of motion and neck supple. Skin:     General: Skin is warm and dry. Neurological:      General: No focal deficit present. Mental Status: She is alert and oriented to person, place, and time. Psychiatric:         Attention and Perception: She perceives auditory and visual hallucinations. Mood and Affect: Mood is anxious. Speech: Speech normal.         Behavior: Behavior is cooperative. Thought Content: Thought content does not include homicidal or suicidal ideation.           MDM Procedures

## 2022-02-09 NOTE — ED NOTES
I have reviewed discharge instructions with the patient. The patient verbalized understanding.  Patient's mother called at patient's request.

## 2022-02-09 NOTE — ED TRIAGE NOTES
Patient arrived via EMS after family called and said she would not respond to them. Patient states she just got all funny feeling like something was going to happen to her. Patient able to follow commands and is answering questions appropriately.

## 2022-03-18 PROBLEM — E03.9 HYPOTHYROIDISM: Status: ACTIVE | Noted: 2019-09-05

## 2022-03-18 PROBLEM — K21.9 GASTROESOPHAGEAL REFLUX DISEASE WITHOUT ESOPHAGITIS: Status: ACTIVE | Noted: 2020-01-30

## 2022-03-19 PROBLEM — E78.5 HYPERLIPIDEMIA: Status: ACTIVE | Noted: 2020-01-30

## 2022-03-19 PROBLEM — R05.3 PERSISTENT COUGH: Status: ACTIVE | Noted: 2020-01-30

## 2022-03-19 PROBLEM — E05.90 HYPERTHYROIDISM: Status: ACTIVE | Noted: 2019-01-03

## 2022-03-19 PROBLEM — R53.83 FATIGUE: Status: ACTIVE | Noted: 2020-07-28

## 2022-03-19 PROBLEM — I10 ESSENTIAL HYPERTENSION: Status: ACTIVE | Noted: 2020-01-30

## 2022-03-19 PROBLEM — R73.03 PREDIABETES: Status: ACTIVE | Noted: 2020-06-29

## 2022-03-19 PROBLEM — F31.81 BIPOLAR II DISORDER (HCC): Status: ACTIVE | Noted: 2018-06-26

## 2022-03-20 PROBLEM — G43.909 MIGRAINE: Status: ACTIVE | Noted: 2020-07-28

## 2022-03-20 PROBLEM — E04.9 GOITER: Status: ACTIVE | Noted: 2020-06-29

## 2022-03-26 ENCOUNTER — APPOINTMENT (OUTPATIENT)
Dept: CT IMAGING | Age: 37
End: 2022-03-26
Attending: INTERNAL MEDICINE
Payer: MEDICAID

## 2022-03-26 ENCOUNTER — HOSPITAL ENCOUNTER (EMERGENCY)
Age: 37
Discharge: HOME | End: 2022-03-26
Attending: INTERNAL MEDICINE
Payer: MEDICAID

## 2022-03-26 VITALS
DIASTOLIC BLOOD PRESSURE: 95 MMHG | SYSTOLIC BLOOD PRESSURE: 157 MMHG | OXYGEN SATURATION: 99 % | HEART RATE: 78 BPM | RESPIRATION RATE: 19 BRPM | TEMPERATURE: 99 F | WEIGHT: 234 LBS | BODY MASS INDEX: 36.73 KG/M2 | HEIGHT: 67 IN

## 2022-03-26 DIAGNOSIS — R42 VERTIGO: Primary | ICD-10-CM

## 2022-03-26 LAB
APPEARANCE UR: ABNORMAL
BACTERIA URNS QL MICRO: ABNORMAL /HPF
BILIRUB UR QL: NEGATIVE
COLOR UR: YELLOW
EPITH CASTS URNS QL MICRO: ABNORMAL /LPF (ref 0–20)
GLUCOSE UR STRIP.AUTO-MCNC: NEGATIVE MG/DL
HCG UR QL: NEGATIVE
HGB UR QL STRIP: NEGATIVE
KETONES UR QL STRIP.AUTO: ABNORMAL MG/DL
LEUKOCYTE ESTERASE UR QL STRIP.AUTO: NEGATIVE
MUCOUS THREADS URNS QL MICRO: NEGATIVE /LPF
NITRITE UR QL STRIP.AUTO: NEGATIVE
PH UR STRIP: 5 [PH] (ref 5–8)
PROT UR STRIP-MCNC: ABNORMAL MG/DL
RBC #/AREA URNS HPF: ABNORMAL /HPF (ref 0–2)
SP GR UR REFRACTOMETRY: 1.03 (ref 1–1.03)
UROBILINOGEN UR QL STRIP.AUTO: 1 EU/DL (ref 0.2–1)
WBC URNS QL MICRO: ABNORMAL /HPF (ref 0–4)

## 2022-03-26 PROCEDURE — 70450 CT HEAD/BRAIN W/O DYE: CPT

## 2022-03-26 PROCEDURE — 81001 URINALYSIS AUTO W/SCOPE: CPT

## 2022-03-26 PROCEDURE — 81025 URINE PREGNANCY TEST: CPT

## 2022-03-26 PROCEDURE — 99284 EMERGENCY DEPT VISIT MOD MDM: CPT

## 2022-03-26 RX ORDER — FLUTICASONE PROPIONATE 50 MCG
1 SPRAY, SUSPENSION (ML) NASAL DAILY
COMMUNITY
Start: 2022-03-03

## 2022-03-26 RX ORDER — AMLODIPINE BESYLATE 5 MG/1
1 TABLET ORAL DAILY
COMMUNITY
Start: 2022-02-14

## 2022-03-26 RX ORDER — MELOXICAM 15 MG/1
1 TABLET ORAL AS NEEDED
COMMUNITY
Start: 2022-02-17

## 2022-03-26 RX ORDER — MECLIZINE HYDROCHLORIDE 25 MG/1
TABLET ORAL
Qty: 20 TABLET | Refills: 0 | Status: SHIPPED | OUTPATIENT
Start: 2022-03-26

## 2022-03-26 RX ORDER — ARIPIPRAZOLE 5 MG/1
1 TABLET ORAL DAILY
COMMUNITY
Start: 2022-02-14

## 2022-03-26 NOTE — ED PROVIDER NOTES
EMERGENCY DEPARTMENT HISTORY AND PHYSICAL EXAM      Date: 3/26/2022  Patient Name: Abbie Dupree    History of Presenting Illness     Chief Complaint   Patient presents with    Dizziness       History Provided By: Patient    HPI: Abbie Dupree, 31 y.o. female with a past medical history significant for schizophrenia, anxiety, panic attack, depression, suicidal thoughts, bipolar, HTN, GERD, HLD, hypothyroid, migraines, prediabetes that presents to the ED with cc of dizziness. I saw this patient 10/11/2021 for vertiginous feelings for 1.5 months and gave her meclizine which she states did not help her. She continues with similar symptoms dizziness, room moving around. She states as long as she is lying down she does not feel any symptoms. She denies headache, fever, tinnitus, diplopia, dysarthria, dysphagia, extremity weakness. There are no other complaints, changes, or physical findings at this time. PCP: Marisela Krishnan MD    No current facility-administered medications on file prior to encounter. Current Outpatient Medications on File Prior to Encounter   Medication Sig Dispense Refill    ARIPiprazole (ABILIFY) 5 mg tablet Take 1 Tablet by mouth daily.  meloxicam (MOBIC) 15 mg tablet Take 1 Tablet by mouth as needed.  amLODIPine (NORVASC) 5 mg tablet Take 1 Tablet by mouth daily.  fluticasone propionate (FLONASE) 50 mcg/actuation nasal spray 1 [place] by Both Nostrils route daily.  olmesartan-hydroCHLOROthiazide (BENICAR HCT) 40-25 mg per tablet Take 1 Tablet by mouth daily.  omeprazole (PRILOSEC) 40 mg capsule TAKE 1 CAPSULE BY MOUTH EVERY DAY 30 MINUTES BEFORE BREAKFAST      [DISCONTINUED] montelukast (SINGULAIR) 10 mg tablet Take 10 mg by mouth daily.  [DISCONTINUED] meclizine (ANTIVERT) 25 mg tablet Take 1 tablet by mouth every 6 hours for the next 3 days. Then stop taking the meclizine.   Restart the meclizine for 3 day intervals if vertigo/ dizziness returns. 20 Tablet 0    buPROPion SR (WELLBUTRIN, ZYBAN) 200 mg SR tablet Take 1 Tablet by mouth daily.  albuterol (PROVENTIL HFA, VENTOLIN HFA, PROAIR HFA) 90 mcg/actuation inhaler Take 2 Puffs by inhalation every six (6) hours as needed for Wheezing. 1 Inhaler 0    lisinopril-hydroCHLOROthiazide (PRINZIDE, ZESTORETIC) 20-25 mg per tablet Take 1 Tab by mouth daily. 90 Tab 2    [DISCONTINUED] lovastatin (MEVACOR) 10 mg tablet TAKE 1 TABLET BY MOUTH EVERY NIGHT AT BEDTIME  Indications: high cholesterol and high triglycerides 90 Tab 1    levothyroxine (SYNTHROID) 50 mcg tablet Take  by mouth Daily (before breakfast).  [DISCONTINUED] ARIPiprazole (ABILIFY) 10 mg tablet Take 10 mg by mouth daily. Past History     Past Medical History:  Past Medical History:   Diagnosis Date    Anxiety     Bipolar II disorder ([facility] [address] 6/26/2018    Depression     Essential hypertension 1/30/2020    Fatigue 7/28/2020    Gastroesophageal reflux disease without esophagitis 1/30/2020    Goiter 6/29/2020    Hyperlipidemia 1/30/2020    Hyperthyroidism 1/3/2019    Hypothyroidism 9/5/2019    Migraine 7/28/2020    Panic attack     Persistent cough 1/30/2020    Postpartum depression 7/28/2020    Prediabetes 6/29/2020    Psychiatric disorder     depression with SI   Migraines     Sleep disorder     Suicidal thoughts        Past Surgical History:  Past Surgical History:   Procedure Laterality Date    HX GYN      2 c-sections       Family History:  Family History   Problem Relation Age of Onset    Diabetes Paternal Aunt     Diabetes Maternal Grandmother     Hypertension Maternal Grandmother     Heart Disease Mother     Diabetes Mother     Diabetes Father        Social History:  Social History     Tobacco Use    Smoking status: Never Smoker    Smokeless tobacco: Never Used   Vaping Use    Vaping Use: Never used   Substance Use Topics    Alcohol use: No    Drug use: No       Allergies:   Allergies Allergen Reactions    Codeine Shortness of Breath    Flexeril [Cyclobenzaprine] Unknown (comments)    [place] Unknown (comments)         Review of Systems     Review of Systems   Constitutional: Negative for chills and fever. HENT: Negative for ear discharge, hearing loss, sore throat and tinnitus. Eyes: Negative for visual disturbance. Respiratory: Negative for cough and shortness of breath. Gastrointestinal: Negative for abdominal pain, diarrhea, nausea and vomiting. Genitourinary: Negative for flank pain. Musculoskeletal: Negative for arthralgias. Neurological: Positive for dizziness. Negative for headaches. Psychiatric/Behavioral: Negative for confusion. Physical Exam   *  Physical Exam  Constitutional:       General: She is not in acute distress. Appearance: Normal appearance. She is not ill-appearing or diaphoretic. Comments: Elevated BMI.  NAD. HENT:      Head: Normocephalic. Mouth/Throat:      Pharynx: Oropharynx is clear. Eyes:      Extraocular Movements: Extraocular movements intact. Pupils: Pupils are equal, round, and reactive to light. Comments: No nystagmus   Neck:      Vascular: No carotid bruit. Cardiovascular:      Rate and Rhythm: Regular rhythm. Heart sounds: Normal heart sounds. Pulmonary:      Breath sounds: Normal breath sounds. Abdominal:      General: Bowel sounds are normal.      Palpations: Abdomen is soft. Musculoskeletal:         General: Normal range of motion. Cervical back: Neck supple. Right lower leg: No edema. Left lower leg: No edema. Skin:     General: Skin is warm and dry. Neurological:      Mental Status: She is alert and oriented to person, place, and time. Cranial Nerves: No cranial nerve deficit. Sensory: No sensory deficit. Motor: No weakness.       Coordination: Coordination normal.      Comments: Tendency to walk to the left with eyes closed   Psychiatric: Behavior: Behavior normal.         Lab and Diagnostic Study Results     Labs -     Recent Results (from the past 12 hour(s))   HCG URINE, QL    Collection Time: 03/26/22  2:50 PM   Result Value Ref Range    HCG urine, QL Negative Negative     URINALYSIS W/ RFLX MICROSCOPIC    Collection Time: 03/26/22  2:50 PM   Result Value Ref Range    Color Yellow      Appearance Turbid      Specific gravity 1.035 1.005 - 1.030    pH (UA) 5.0 5.0 - 8.0      Protein Trace (A) Negative mg/dL    Glucose Negative Negative mg/dL    Ketone Trace (A) Negative mg/dL    Bilirubin Negative Negative      Blood Negative Negative      Urobilinogen 1.0 0.2 - 1.0 EU/dL    Nitrites Negative Negative      Leukocyte Esterase Negative Negative     URINE MICROSCOPIC    Collection Time: 03/26/22  2:50 PM   Result Value Ref Range    WBC 0-4 0 - 4 /hpf    RBC 5-10 0 - 2 /hpf    Epithelial cells Many 0 - 20 /lpf    Bacteria 2+ (A) None /hpf    Mucus Negative /lpf       Radiologic Studies -   @lastxrresult@  CT Results  (Last 48 hours)               03/26/22 1529  CT HEAD WO CONT Final result    Impression:      1. No CT findings of an acute intracranial abnormality. Please note that   noncontrast head CT may be normal in early acute infarct. 2. Asymmetric left cerebral hemispheric volume loss, of unknown etiology and new   since prior exam. This finding may be seen in the setting of interval carotid   arterial vascular compromise. Narrative:  EXAM: CT HEAD WO CONT       CLINICAL INDICATION/HISTORY: dizziness   -Additional: None       COMPARISON: 11/19/2010       TECHNIQUE: Axial CT imaging of the head was performed without intravenous   contrast.   One or more dose reduction techniques were used on this CT: automated exposure   control, adjustment of the mAs and/or kVp according to patient size, and   iterative reconstruction techniques.   The specific techniques used on this CT   exam have been documented in the patient's electronic medical record. Digital   Imaging and Communications in Medicine (DICOM) format image data are available   to nonaffiliated external healthcare facilities or entities on a secure, media   free, reciprocally searchable basis with patient authorization for at least a   12-month period after this study. _______________       FINDINGS:       BRAIN:      > Brain volume: Asymmetric left hemispheric accentuation of the cortical   sulcal gyral pattern, sparing the posterior fossa with mild enlargement lateral   ventricle, new since prior exam.     > White matter: Little or no white matter disease. > Infarcts, encephalomalacia: None.      > Parenchymal mass: None.      > Parenchymal hemorrhage: None.      > Midline shift: None.      > Miscellaneous: None. EXTRA-AXIAL SPACES: Unremarkable. No fluid collections. CALVARIUM: Intact. SINUSES, MASTOIDS: Clear. OTHER EXTRACRANIAL: Unremarkable.       _______________               CXR Results  (Last 48 hours)    None            Medical Decision Making   - I am the first provider for this patient. - I reviewed the vital signs, available nursing notes, past medical history, past surgical history, family history and social history. - Initial assessment performed. The patients presenting problems have been discussed, and they are in agreement with the care plan formulated and outlined with them. I have encouraged them to ask questions as they arise throughout their visit. Vital Signs-Reviewed the patient's vital signs. Patient Vitals for the past 12 hrs:   Temp Pulse Resp BP SpO2   03/26/22 1404 99 °F (37.2 °C) 78 19 (!) 157/95 99 %       Records Reviewed: Nursing Notes      ED Course:   Vertigo, will resume meclizine and refer to Dr. Narda Lopez (neurology)    Procedures   Medic    Disposition   Disposition:    DISCHARGE PLAN:  1.    Current Discharge Medication List      CONTINUE these medications which have NOT CHANGED    Details ARIPiprazole (ABILIFY) 5 mg tablet Take 1 Tablet by mouth daily. meloxicam (MOBIC) 15 mg tablet Take 1 Tablet by mouth as needed. amLODIPine (NORVASC) 5 mg tablet Take 1 Tablet by mouth daily. fluticasone propionate (FLONASE) 50 mcg/actuation nasal spray 1 [place] by Both Nostrils route daily. olmesartan-hydroCHLOROthiazide (BENICAR HCT) 40-25 mg per tablet Take 1 Tablet by mouth daily. omeprazole (PRILOSEC) 40 mg capsule TAKE 1 CAPSULE BY MOUTH EVERY DAY 30 MINUTES BEFORE BREAKFAST      buPROPion SR (WELLBUTRIN, ZYBAN) 200 mg SR tablet Take 1 Tablet by mouth daily. albuterol (PROVENTIL HFA, VENTOLIN HFA, PROAIR HFA) 90 mcg/actuation inhaler Take 2 Puffs by inhalation every six (6) hours as needed for Wheezing. Qty: 1 Inhaler, Refills: 0      lisinopril-hydroCHLOROthiazide (PRINZIDE, ZESTORETIC) 20-25 mg per tablet Take 1 Tab by mouth daily. Qty: 90 Tab, Refills: 2      levothyroxine (SYNTHROID) 50 mcg tablet Take  by mouth Daily (before breakfast). 2.   Follow-up Information     Follow up With Specialties Details Why Contact García Mathur MD Neurology Schedule an appointment as soon as possible for a visit in 1 week  [address] [facility] [address]  [id number] [address]          3. Return to ED if worse   4. Current Discharge Medication List      START taking these medications    Details   meclizine (ANTIVERT) 25 mg tablet Take 1 tablet by mouth every 6 hours for the next 3 days. Then stop taking the meclizine. Restart the meclizine for 3 day intervals if vertigo/ dizziness returns. Qty: 20 Tablet, Refills: 0  Start date: 3/26/2022               Diagnosis     Clinical Impression:   1. Vertigo    2. Asymmetric left cerebral hemisphere volume loss    Attestations:    Betzaida Connell MD    Please note that this dictation was completed with "[employer] the [employer] voice recognition software.   Quite often unanticipated grammatical, syntax, homophones, and other interpretive errors are inadvertently transcribed by the computer software. Please disregard these errors. Please excuse any errors that have escaped final proofreading. Thank you.

## 2023-05-15 ENCOUNTER — HOSPITAL ENCOUNTER (EMERGENCY)
Age: 38
Discharge: HOME | End: 2023-05-15
Attending: EMERGENCY MEDICINE
Payer: MEDICAID

## 2023-05-15 ENCOUNTER — APPOINTMENT (OUTPATIENT)
Age: 38
End: 2023-05-15
Payer: MEDICAID

## 2023-05-15 VITALS
DIASTOLIC BLOOD PRESSURE: 92 MMHG | TEMPERATURE: 98.8 F | SYSTOLIC BLOOD PRESSURE: 153 MMHG | OXYGEN SATURATION: 99 % | RESPIRATION RATE: 16 BRPM | HEART RATE: 76 BPM | WEIGHT: 254.4 LBS | HEIGHT: 67 IN | BODY MASS INDEX: 39.93 KG/M2

## 2023-05-15 DIAGNOSIS — F41.1 ANXIETY STATE: ICD-10-CM

## 2023-05-15 DIAGNOSIS — R07.89 ATYPICAL CHEST PAIN: Primary | ICD-10-CM

## 2023-05-15 LAB
ALBUMIN SERPL-MCNC: 3.8 G/DL (ref 3.4–5)
ALBUMIN/GLOB SERPL: 0.9 (ref 0.8–1.7)
ALP SERPL-CCNC: 67 U/L (ref 45–117)
ALT SERPL-CCNC: 20 U/L (ref 13–56)
ANION GAP SERPL CALC-SCNC: 6 MMOL/L (ref 3–18)
AST SERPL W P-5'-P-CCNC: 13 U/L (ref 10–38)
BASOPHILS # BLD: 0 K/UL (ref 0–0.1)
BASOPHILS NFR BLD: 1 % (ref 0–2)
BILIRUB SERPL-MCNC: 0.3 MG/DL (ref 0.2–1)
BUN SERPL-MCNC: 7 MG/DL (ref 7–18)
BUN/CREAT SERPL: 9 (ref 12–20)
CA-I BLD-MCNC: 8.9 MG/DL (ref 8.5–10.1)
CHLORIDE SERPL-SCNC: 107 MMOL/L (ref 100–111)
CO2 SERPL-SCNC: 24 MMOL/L (ref 21–32)
CREAT SERPL-MCNC: 0.75 MG/DL (ref 0.6–1.3)
DIFFERENTIAL METHOD BLD: NORMAL
EOSINOPHIL # BLD: 0.1 K/UL (ref 0–0.4)
EOSINOPHIL NFR BLD: 1 % (ref 0–5)
ERYTHROCYTE [DISTWIDTH] IN BLOOD BY AUTOMATED COUNT: 14.3 % (ref 11.6–14.5)
GLOBULIN SER CALC-MCNC: 4.4 G/DL (ref 2–4)
GLUCOSE SERPL-MCNC: 100 MG/DL (ref 74–99)
HCT VFR BLD AUTO: 38.2 % (ref 35–45)
HGB BLD-MCNC: 12.6 G/DL (ref 12–16)
IMM GRANULOCYTES # BLD AUTO: 0 K/UL (ref 0–0.04)
IMM GRANULOCYTES NFR BLD AUTO: 0 % (ref 0–0.5)
LYMPHOCYTES # BLD: 1.6 K/UL (ref 0.9–3.6)
LYMPHOCYTES NFR BLD: 25 % (ref 21–52)
MAGNESIUM SERPL-MCNC: 2 MG/DL (ref 1.6–2.6)
MCH RBC QN AUTO: 28.9 PG (ref 24–34)
MCHC RBC AUTO-ENTMCNC: 33 G/DL (ref 31–37)
MCV RBC AUTO: 87.6 FL (ref 78–100)
MONOCYTES # BLD: 0.3 K/UL (ref 0.05–1.2)
MONOCYTES NFR BLD: 6 % (ref 3–10)
NEUTS SEG # BLD: 4.2 K/UL (ref 1.8–8)
NEUTS SEG NFR BLD: 67 % (ref 40–73)
NRBC # BLD: 0 K/UL (ref 0–0.01)
NRBC BLD-RTO: 0 PER 100 WBC
PLATELET # BLD AUTO: 330 K/UL (ref 135–420)
PMV BLD AUTO: 10.7 FL (ref 9.2–11.8)
POTASSIUM SERPL-SCNC: 3.6 MMOL/L (ref 3.5–5.5)
PROT SERPL-MCNC: 8.2 G/DL (ref 6.4–8.2)
RBC # BLD AUTO: 4.36 M/UL (ref 4.2–5.3)
SODIUM SERPL-SCNC: 137 MMOL/L (ref 136–145)
TROPONIN I SERPL HS-MCNC: 4 NG/L (ref 0–54)
TROPONIN I SERPL HS-MCNC: 5 NG/L (ref 0–54)
WBC # BLD AUTO: 6.2 K/UL (ref 4.6–13.2)

## 2023-05-15 PROCEDURE — 99285 EMERGENCY DEPT VISIT HI MDM: CPT

## 2023-05-15 PROCEDURE — 83735 ASSAY OF MAGNESIUM: CPT

## 2023-05-15 PROCEDURE — 85025 COMPLETE CBC W/AUTO DIFF WBC: CPT

## 2023-05-15 PROCEDURE — 93005 ELECTROCARDIOGRAM TRACING: CPT | Performed by: EMERGENCY MEDICINE

## 2023-05-15 PROCEDURE — 80053 COMPREHEN METABOLIC PANEL: CPT

## 2023-05-15 PROCEDURE — 71045 X-RAY EXAM CHEST 1 VIEW: CPT

## 2023-05-15 PROCEDURE — 84484 ASSAY OF TROPONIN QUANT: CPT

## 2023-05-15 ASSESSMENT — PAIN SCALES - GENERAL: PAINLEVEL_OUTOF10: 0

## 2023-05-15 ASSESSMENT — LIFESTYLE VARIABLES
HOW MANY STANDARD DRINKS CONTAINING ALCOHOL DO YOU HAVE ON A TYPICAL DAY: PATIENT DOES NOT DRINK
HOW OFTEN DO YOU HAVE A DRINK CONTAINING ALCOHOL: NEVER

## 2023-05-15 ASSESSMENT — PAIN - FUNCTIONAL ASSESSMENT: PAIN_FUNCTIONAL_ASSESSMENT: 0-10
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
## 2023-05-15 NOTE — ED NOTES
Discharge instructions reviewed with patient. Patient verbalized understanding. Patient advised to follow up as directed on discharge instructions. Patient denies questions, needs or concerns at this time. Patient verbalized understanding. No s/sx of distress noted.        Anamaria Suggs RN  05/15/23 0979

## 2023-05-16 LAB
EKG ATRIAL RATE: 87 BPM
EKG DIAGNOSIS: NORMAL
EKG P AXIS: 45 DEGREES
EKG P-R INTERVAL: 160 MS
EKG Q-T INTERVAL: 362 MS
EKG QRS DURATION: 82 MS
EKG QTC CALCULATION (BAZETT): 435 MS
EKG R AXIS: 26 DEGREES
EKG T AXIS: 38 DEGREES
EKG VENTRICULAR RATE: 87 BPM

## 2023-05-16 NOTE — ED PROVIDER NOTES
nasal spray 1 spray by Nasal route dailyHistorical Med      levothyroxine (SYNTHROID) 50 MCG tablet Take by mouth every morning (before breakfast)Historical Med      lisinopril-hydroCHLOROthiazide (PRINZIDE;ZESTORETIC) 20-25 MG per tablet Take 1 tablet by mouth dailyHistorical Med      meclizine (ANTIVERT) 25 MG tablet Take 1 tablet by mouth every 6 hours for the next 3 days. Then stop taking the meclizine. Restart the meclizine for 3 day intervals if vertigo/ dizziness returns. Historical Med      meloxicam (MOBIC) 15 MG tablet Take 1 tablet by mouth as neededHistorical Med      olmesartan-hydroCHLOROthiazide (BENICAR HCT) 40-25 MG per tablet Take 1 tablet by mouth dailyHistorical Med      omeprazole (PRILOSEC) 40 MG delayed release capsule TAKE 1 CAPSULE BY MOUTH EVERY DAY 30 MINUTES BEFORE BREAKFASTHistorical Med             DISCONTINUED MEDICATIONS:  Discharge Medication List as of 5/15/2023  7:02 PM          I am the Primary Clinician of Record. Shelbie Villalta MD (electronically signed)    (Please note that parts of this dictation were completed with voice recognition software. Quite often unanticipated grammatical, syntax, homophones, and other interpretive errors are inadvertently transcribed by the computer software. Please disregards these errors.  Please excuse any errors that have escaped final proofreading.)        Monika Lafleur MD  05/16/23 7771

## 2023-06-09 ENCOUNTER — APPOINTMENT (OUTPATIENT)
Age: 38
End: 2023-06-09
Payer: MEDICAID

## 2023-06-09 ENCOUNTER — HOSPITAL ENCOUNTER (EMERGENCY)
Age: 38
Discharge: HOME | End: 2023-06-09
Attending: EMERGENCY MEDICINE
Payer: MEDICAID

## 2023-06-09 VITALS
BODY MASS INDEX: 39.24 KG/M2 | HEART RATE: 81 BPM | TEMPERATURE: 97.7 F | RESPIRATION RATE: 18 BRPM | WEIGHT: 250 LBS | SYSTOLIC BLOOD PRESSURE: 128 MMHG | OXYGEN SATURATION: 100 % | DIASTOLIC BLOOD PRESSURE: 83 MMHG | HEIGHT: 67 IN

## 2023-06-09 DIAGNOSIS — F41.1 ANXIETY STATE: ICD-10-CM

## 2023-06-09 DIAGNOSIS — J20.9 ACUTE BRONCHITIS WITH BRONCHOSPASM: Primary | ICD-10-CM

## 2023-06-09 PROCEDURE — 93005 ELECTROCARDIOGRAM TRACING: CPT | Performed by: EMERGENCY MEDICINE

## 2023-06-09 PROCEDURE — 94640 AIRWAY INHALATION TREATMENT: CPT

## 2023-06-09 PROCEDURE — 71045 X-RAY EXAM CHEST 1 VIEW: CPT

## 2023-06-09 PROCEDURE — 6360000002 HC RX W HCPCS: Performed by: EMERGENCY MEDICINE

## 2023-06-09 PROCEDURE — 94664 DEMO&/EVAL PT USE INHALER: CPT

## 2023-06-09 RX ORDER — BENZONATATE 200 MG/1
200 CAPSULE ORAL 3 TIMES DAILY PRN
Qty: 30 CAPSULE | Refills: 0 | Status: SHIPPED | OUTPATIENT
Start: 2023-06-09 | End: 2023-06-16

## 2023-06-09 RX ORDER — ALBUTEROL SULFATE 2.5 MG/3ML
5 SOLUTION RESPIRATORY (INHALATION)
Status: COMPLETED | OUTPATIENT
Start: 2023-06-09 | End: 2023-06-09

## 2023-06-09 RX ADMIN — ALBUTEROL SULFATE 5 MG: 2.5 SOLUTION RESPIRATORY (INHALATION) at 18:41

## 2023-06-09 ASSESSMENT — PAIN - FUNCTIONAL ASSESSMENT: PAIN_FUNCTIONAL_ASSESSMENT: NONE - DENIES PAIN

## 2023-06-09 ASSESSMENT — LIFESTYLE VARIABLES
HOW OFTEN DO YOU HAVE A DRINK CONTAINING ALCOHOL: NEVER
HOW MANY STANDARD DRINKS CONTAINING ALCOHOL DO YOU HAVE ON A TYPICAL DAY: PATIENT DOES NOT DRINK

## 2023-06-09 NOTE — ED TRIAGE NOTES
EMS called to residence for pt that c/o anxiety, cp.  Pt states that she has asthma and thinks that the air quality/smoke outside has made her asthma \"kick up\" pt states she took inhaler and anxiety meds and feels somewhat better

## 2023-06-09 NOTE — DISCHARGE INSTRUCTIONS
Try the following to help reduce nasal congestion:    Saline irrigation every 2 hours as needed, then blow your nose. Nasal steroid such as Nasacort or Flonase to help reduce congestion. This will take approximately 2 to 3 days to be maximally effective. If you have significant nasal congestion you may try oxymetazoline or phenylephrine nasal spray for up to 2 days. Any longer use may result in worsened congestion. Prop your head up at nighttime to aid drainage and help reduce sore throat. Try the following to help reduce cough: Albuterol inhaler as prescribed. Tessalon Perles may be used during the day or night. Codeine may be used at nighttime if prescribed. Avoid driving for approximately 6 hours afterwards as this may cause sleepiness. You may try using Robitussin DM (dextromethorphan) at nighttime to reduce cough and aid sleep.

## 2023-06-09 NOTE — PROGRESS NOTES
Albuterol 5 mg HHN given via mouthpiece. TX tolerated well. Patient educated on proper usage of home rescue inhaler. 06/09/23 4879   Treatment   Treatment Type HHN   $Treatment Type $Inhaled Therapy/Meds   Medications Albuterol  (5.0 mg)   Pre-Tx Pulse 79   Pre-Tx Resps 20   Breath Sounds Pre-Tx VAL Clear;Diminished   Breath Sounds Pre-Tx LLL Clear;Diminished   Breath Sounds Pre-Tx RUL Clear;Diminished   Breath Sounds Pre-Tx RML Clear;Diminished   Breath Sounds Pre-Tx RLL Clear;Diminished   Breath Sounds Post-Tx VAL Clear;Diminished   Breath Sounds Post-Tx LLL Clear;Diminished   Breath Sounds Post-Tx RUL Clear;Diminished   Breath Sounds Post-Tx RML Clear;Diminished   Breath Sounds Post-Tx RLL Clear;Diminished   Post-Tx Pulse 92   Post-Tx Resps 20   Delivery Source Mouthpiece   Position Semi-Rondon's   Treatment Tolerance Well   Duration 8   Is patient on O2?  N   Oxygen Therapy/Pulse Ox   Pulse 79   Respirations 20   SpO2 100 %   RT Misc Charges   $RT Education $HHN/MDI/IPPB Demo or Carol Ann  (educated on proper usage of home MDI)

## 2023-06-09 NOTE — ED PROVIDER NOTES
(90 [place]) MCG/ACT INHALER    Inhale 2 puffs into the lungs every 6 hours as needed    AMLODIPINE (NORVASC) 5 MG TABLET    Take 1 tablet by mouth daily    ARIPIPRAZOLE (ABILIFY) 5 MG TABLET    Take 1 tablet by mouth daily    BUPROPION (WELLBUTRIN SR) 200 MG EXTENDED RELEASE TABLET    Take 1 tablet by mouth daily    FLUTICASONE (FLONASE) 50 MCG/ACT NASAL SPRAY    1 spray by Nasal route daily    LEVOTHYROXINE (SYNTHROID) 50 MCG TABLET    Take by mouth every morning (before breakfast)    LISINOPRIL-HYDROCHLOROTHIAZIDE (PRINZIDE;ZESTORETIC) 20-25 MG PER TABLET    Take 1 tablet by mouth daily    MECLIZINE (ANTIVERT) 25 MG TABLET    Take 1 tablet by mouth every 6 hours for the next 3 days. Then stop taking the meclizine. Restart the meclizine for 3 day intervals if vertigo/ dizziness returns.     MELOXICAM (MOBIC) 15 MG TABLET    Take 1 tablet by mouth as needed    OLMESARTAN-HYDROCHLOROTHIAZIDE (BENICAR HCT) 40-25 MG PER TABLET    Take 1 tablet by mouth daily    OMEPRAZOLE (PRILOSEC) 40 MG DELAYED RELEASE CAPSULE    TAKE 1 CAPSULE BY MOUTH EVERY DAY 30 MINUTES BEFORE BREAKFAST       ALLERGIES     Codeine, Cyclobenzaprine, and Strawberry    FAMILY HISTORY       Family History   Problem Relation Age of Onset    Heart Disease Mother     Hypertension Maternal Grandmother     Diabetes Father     Diabetes Maternal Grandmother     Diabetes Paternal Aunt     Diabetes Mother           SOCIAL HISTORY       Social History     Socioeconomic History    Marital status: Single     Spouse name: None    Number of children: None    Years of education: None    Highest education level: None   Tobacco Use    Smoking status: Never    Smokeless tobacco: Never   Substance and Sexual Activity    Alcohol use: No    Drug use: No       SCREENINGS         [place] Coma Scale  Eye Opening: Spontaneous  Best Verbal Response: Oriented  Best Motor Response: Obeys commands  [place] Coma Scale Score: 15                     CIWA Assessment  BP: (!)

## 2023-06-10 NOTE — ED NOTES
Pt discharged. Discharge instructions provided to pt, verbal understanding by pt. All questions answered.  No questions by pt at time of d/c.      Matt Leal RN  06/09/23 2022

## 2023-06-11 LAB
EKG ATRIAL RATE: 84 BPM
EKG DIAGNOSIS: NORMAL
EKG P AXIS: 50 DEGREES
EKG P-R INTERVAL: 158 MS
EKG Q-T INTERVAL: 388 MS
EKG QRS DURATION: 88 MS
EKG QTC CALCULATION (BAZETT): 458 MS
EKG R AXIS: 28 DEGREES
EKG T AXIS: 41 DEGREES
EKG VENTRICULAR RATE: 84 BPM

## 2023-07-22 ENCOUNTER — APPOINTMENT (OUTPATIENT)
Age: 38
End: 2023-07-22
Payer: MEDICAID

## 2023-07-22 ENCOUNTER — HOSPITAL ENCOUNTER (EMERGENCY)
Age: 38
Discharge: HOME | End: 2023-07-22
Attending: INTERNAL MEDICINE
Payer: MEDICAID

## 2023-07-22 VITALS
SYSTOLIC BLOOD PRESSURE: 166 MMHG | BODY MASS INDEX: 39.24 KG/M2 | HEART RATE: 87 BPM | HEIGHT: 67 IN | OXYGEN SATURATION: 99 % | RESPIRATION RATE: 18 BRPM | DIASTOLIC BLOOD PRESSURE: 101 MMHG | TEMPERATURE: 98.7 F | WEIGHT: 250 LBS

## 2023-07-22 DIAGNOSIS — R07.9 CHEST PAIN, UNSPECIFIED TYPE: ICD-10-CM

## 2023-07-22 DIAGNOSIS — R19.7 DIARRHEA, UNSPECIFIED TYPE: Primary | ICD-10-CM

## 2023-07-22 LAB
ALBUMIN SERPL-MCNC: 3.6 G/DL (ref 3.4–5)
ALBUMIN/GLOB SERPL: 0.9 (ref 0.8–1.7)
ALP SERPL-CCNC: 66 U/L (ref 45–117)
ALT SERPL-CCNC: 15 U/L (ref 13–56)
ANION GAP SERPL CALC-SCNC: 8 MMOL/L (ref 3–18)
APPEARANCE UR: NORMAL
AST SERPL W P-5'-P-CCNC: 9 U/L (ref 10–38)
BASOPHILS # BLD: 0 K/UL (ref 0–0.1)
BASOPHILS NFR BLD: 0 % (ref 0–2)
BILIRUB SERPL-MCNC: 0.2 MG/DL (ref 0.2–1)
BILIRUB UR QL: NEGATIVE
BUN SERPL-MCNC: 8 MG/DL (ref 7–18)
BUN/CREAT SERPL: 9 (ref 12–20)
CA-I BLD-MCNC: 8.5 MG/DL (ref 8.5–10.1)
CHLORIDE SERPL-SCNC: 107 MMOL/L (ref 100–111)
CO2 SERPL-SCNC: 24 MMOL/L (ref 21–32)
COLOR UR: YELLOW
CREAT SERPL-MCNC: 0.87 MG/DL (ref 0.6–1.3)
DIFFERENTIAL METHOD BLD: ABNORMAL
EKG ATRIAL RATE: 109 BPM
EKG DIAGNOSIS: NORMAL
EKG P AXIS: 44 DEGREES
EKG P-R INTERVAL: 154 MS
EKG Q-T INTERVAL: 352 MS
EKG QRS DURATION: 86 MS
EKG QTC CALCULATION (BAZETT): 474 MS
EKG R AXIS: 15 DEGREES
EKG T AXIS: 30 DEGREES
EKG VENTRICULAR RATE: 109 BPM
EOSINOPHIL # BLD: 0 K/UL (ref 0–0.4)
EOSINOPHIL NFR BLD: 1 % (ref 0–5)
ERYTHROCYTE [DISTWIDTH] IN BLOOD BY AUTOMATED COUNT: 14.6 % (ref 11.6–14.5)
GLOBULIN SER CALC-MCNC: 4 G/DL (ref 2–4)
GLUCOSE SERPL-MCNC: 128 MG/DL (ref 74–99)
GLUCOSE UR STRIP.AUTO-MCNC: NEGATIVE MG/DL
HCT VFR BLD AUTO: 34.3 % (ref 35–45)
HGB BLD-MCNC: 11.3 G/DL (ref 12–16)
HGB UR QL STRIP: NEGATIVE
IMM GRANULOCYTES # BLD AUTO: 0 K/UL (ref 0–0.04)
IMM GRANULOCYTES NFR BLD AUTO: 0 % (ref 0–0.5)
KETONES UR QL STRIP.AUTO: NEGATIVE MG/DL
LEUKOCYTE ESTERASE UR QL STRIP.AUTO: NEGATIVE
LIPASE SERPL-CCNC: 90 U/L (ref 73–393)
LYMPHOCYTES # BLD: 1.7 K/UL (ref 0.9–3.6)
LYMPHOCYTES NFR BLD: 28 % (ref 21–52)
MCH RBC QN AUTO: 29 PG (ref 24–34)
MCHC RBC AUTO-ENTMCNC: 32.9 G/DL (ref 31–37)
MCV RBC AUTO: 88.2 FL (ref 78–100)
MONOCYTES # BLD: 0.3 K/UL (ref 0.05–1.2)
MONOCYTES NFR BLD: 5 % (ref 3–10)
NEUTS SEG # BLD: 3.9 K/UL (ref 1.8–8)
NEUTS SEG NFR BLD: 66 % (ref 40–73)
NITRITE UR QL STRIP.AUTO: NEGATIVE
NRBC # BLD: 0 K/UL (ref 0–0.01)
NRBC BLD-RTO: 0 PER 100 WBC
PH UR STRIP: 5.5 (ref 5–8)
PLATELET # BLD AUTO: 356 K/UL (ref 135–420)
PMV BLD AUTO: 10.1 FL (ref 9.2–11.8)
POTASSIUM SERPL-SCNC: 3.2 MMOL/L (ref 3.5–5.5)
PROT SERPL-MCNC: 7.6 G/DL (ref 6.4–8.2)
PROT UR STRIP-MCNC: NEGATIVE MG/DL
RBC # BLD AUTO: 3.89 M/UL (ref 4.2–5.3)
SODIUM SERPL-SCNC: 139 MMOL/L (ref 136–145)
SP GR UR REFRACTOMETRY: 1.01 (ref 1–1.03)
TROPONIN I SERPL HS-MCNC: 5 NG/L (ref 0–54)
UROBILINOGEN UR QL STRIP.AUTO: 0.2 EU/DL (ref 0.2–1)
WBC # BLD AUTO: 6 K/UL (ref 4.6–13.2)

## 2023-07-22 PROCEDURE — 85025 COMPLETE CBC W/AUTO DIFF WBC: CPT

## 2023-07-22 PROCEDURE — 81003 URINALYSIS AUTO W/O SCOPE: CPT

## 2023-07-22 PROCEDURE — 6360000002 HC RX W HCPCS: Performed by: INTERNAL MEDICINE

## 2023-07-22 PROCEDURE — 96374 THER/PROPH/DIAG INJ IV PUSH: CPT

## 2023-07-22 PROCEDURE — 84484 ASSAY OF TROPONIN QUANT: CPT

## 2023-07-22 PROCEDURE — 2580000003 HC RX 258: Performed by: INTERNAL MEDICINE

## 2023-07-22 PROCEDURE — 71045 X-RAY EXAM CHEST 1 VIEW: CPT

## 2023-07-22 PROCEDURE — 80053 COMPREHEN METABOLIC PANEL: CPT

## 2023-07-22 PROCEDURE — 83690 ASSAY OF LIPASE: CPT

## 2023-07-22 PROCEDURE — 6370000000 HC RX 637 (ALT 250 FOR IP): Performed by: INTERNAL MEDICINE

## 2023-07-22 PROCEDURE — 99285 EMERGENCY DEPT VISIT HI MDM: CPT

## 2023-07-22 PROCEDURE — 93005 ELECTROCARDIOGRAM TRACING: CPT | Performed by: INTERNAL MEDICINE

## 2023-07-22 RX ORDER — LOPERAMIDE HYDROCHLORIDE 2 MG/1
2 CAPSULE ORAL 4 TIMES DAILY PRN
Qty: 12 CAPSULE | Refills: 0 | Status: SHIPPED | OUTPATIENT
Start: 2023-07-22 | End: 2023-07-25

## 2023-07-22 RX ORDER — ACETAMINOPHEN 500 MG
1000 TABLET ORAL
Status: COMPLETED | OUTPATIENT
Start: 2023-07-22 | End: 2023-07-22

## 2023-07-22 RX ORDER — KETOROLAC TROMETHAMINE 30 MG/ML
15 INJECTION, SOLUTION INTRAMUSCULAR; INTRAVENOUS ONCE
Status: COMPLETED | OUTPATIENT
Start: 2023-07-22 | End: 2023-07-22

## 2023-07-22 RX ORDER — LOPERAMIDE HYDROCHLORIDE 2 MG/1
2 CAPSULE ORAL
Status: COMPLETED | OUTPATIENT
Start: 2023-07-22 | End: 2023-07-22

## 2023-07-22 RX ORDER — 0.9 % SODIUM CHLORIDE 0.9 %
500 INTRAVENOUS SOLUTION INTRAVENOUS ONCE
Status: COMPLETED | OUTPATIENT
Start: 2023-07-22 | End: 2023-07-22

## 2023-07-22 RX ORDER — POTASSIUM CHLORIDE 750 MG/1
40 TABLET, EXTENDED RELEASE ORAL ONCE
Status: COMPLETED | OUTPATIENT
Start: 2023-07-22 | End: 2023-07-22

## 2023-07-22 RX ADMIN — LOPERAMIDE HYDROCHLORIDE 2 MG: 2 CAPSULE ORAL at 16:15

## 2023-07-22 RX ADMIN — KETOROLAC TROMETHAMINE 15 MG: 30 INJECTION, SOLUTION INTRAMUSCULAR at 16:17

## 2023-07-22 RX ADMIN — ACETAMINOPHEN 1000 MG: 500 TABLET ORAL at 16:15

## 2023-07-22 RX ADMIN — SODIUM CHLORIDE 500 ML: 9 INJECTION, SOLUTION INTRAVENOUS at 15:46

## 2023-07-22 RX ADMIN — POTASSIUM CHLORIDE 40 MEQ: 750 TABLET, EXTENDED RELEASE ORAL at 16:15

## 2023-07-22 ASSESSMENT — ENCOUNTER SYMPTOMS
CONSTIPATION: 0
ABDOMINAL PAIN: 1
DIARRHEA: 1
NAUSEA: 0
COUGH: 0
VOMITING: 0
TROUBLE SWALLOWING: 0
SHORTNESS OF BREATH: 0

## 2023-07-22 ASSESSMENT — LIFESTYLE VARIABLES
HOW MANY STANDARD DRINKS CONTAINING ALCOHOL DO YOU HAVE ON A TYPICAL DAY: PATIENT DOES NOT DRINK
HOW MANY STANDARD DRINKS CONTAINING ALCOHOL DO YOU HAVE ON A TYPICAL DAY: PATIENT DOES NOT DRINK
HOW OFTEN DO YOU HAVE A DRINK CONTAINING ALCOHOL: NEVER
HOW OFTEN DO YOU HAVE A DRINK CONTAINING ALCOHOL: NEVER

## 2023-07-22 ASSESSMENT — PAIN SCALES - GENERAL: PAINLEVEL_OUTOF10: 0

## 2023-07-22 ASSESSMENT — PAIN - FUNCTIONAL ASSESSMENT: PAIN_FUNCTIONAL_ASSESSMENT: 0-10

## 2023-07-22 NOTE — ED TRIAGE NOTES
States hx HTN, started on a new med, has been having chest pain x 1 month when started on a new medication. Stopped taking medication on July 6 th. Stopped Olmesartan on last Wednesday.        Diarrhea x 3 today, no OTC meds to help    Left arm pain started today, no known injury      Patient states she has not taken her bp meds since last week due to the side effects of bp meds

## 2023-07-22 NOTE — ED PROVIDER NOTES
[facility] EMERGENCY DEPT  EMERGENCY DEPARTMENT ENCOUNTER      Pt Name: Otilia Saini  MRN: [id number]  [address] [facility] [address] 1985  Date of evaluation: 7/22/2023  Provider: Hector Loya MD    [address] [place] [address]       Chief Complaint   Patient presents with    Chest Pain    Flank Pain    Diarrhea    Urinary Retention         HISTORY OF PRESENT ILLNESS   (Location/Symptom, Timing/Onset, Context/Setting, Quality, Duration, Modifying Factors, Severity)  Note limiting factors. Otilia Saini is a 30 y.o. female who presents to the emergency department      68-year-old female with multiple complaints: Diarrhea and left flank pain since yesterday morning, feels dehydrated, feels weak. Also states that she had mild transient sharp pain in the left upper outer quadrant of the breast that radiated to the left axilla since this am.  She states she has mild headaches. Patient states that she developed mild left-sided flank left upper quadrant abdominal pain yesterday followed by watery diarrhea, nonbloody. No nausea no vomiting, no fevers or chills. She states that this continued today and she feels dehydrated and weak. Past medical history: Anxiety, bipolar, depression, hypertension, GERD, hyperthyroid, migraine, panic attacks, hyperlipidemia. Patient states that she was started on lisinopril HCTZ and omlesartan which she feels that she is allergic to these and stopped taking them. PCP Dr. Hoa Lopez    The history is provided by the patient. Nursing Notes were reviewed. REVIEW OF SYSTEMS    (2-9 systems for level 4, 10 or more for level 5)     Review of Systems   Constitutional:  Positive for fatigue. Negative for chills and fever. HENT:  Negative for trouble swallowing. Eyes:  Negative for visual disturbance. Respiratory:  Negative for cough and shortness of breath. Cardiovascular:  Positive for chest pain. Negative for palpitations. Gastrointestinal:  Positive for abdominal pain and diarrhea.  Negative effort is normal. No respiratory distress. Breath sounds: Normal breath sounds. No wheezing or rales. Abdominal:      General: There is no distension. Palpations: Abdomen is soft. Tenderness: There is no abdominal tenderness. There is no right CVA tenderness. Musculoskeletal:         General: Normal range of motion. Cervical back: Neck supple. Right lower leg: No edema. Left lower leg: No edema. Skin:     General: Skin is warm. Neurological:      Mental Status: She is alert and oriented to person, place, and time. Psychiatric:         Behavior: Behavior normal.       DIAGNOSTIC RESULTS     EKG: All EKG's are interpreted by the Emergency Department Physician who either signs or Co-signs this chart in the absence of a cardiologist.    EKG [read by Dr Sheila Vallecillo 1503: Normal sinus rhythm 109/min. Normal TX, normal QRS, normal axis, normal QTc. Left atrial enlargement, no acute ST segment changes. Sinus tachycardia. RADIOLOGY:   Non-plain film images such as CT, Ultrasound and MRI are read by the radiologist. Plain radiographic images are visualized and preliminarily interpreted by the emergency physician with the below findings:      Interpretation per the Radiologist below, if available at the time of this note:    XR CHEST PORTABLE   Final Result   1.  No acute disease                ED BEDSIDE ULTRASOUND:   Performed by ED Physician - none    LABS:  Labs Reviewed   CBC WITH AUTO DIFFERENTIAL - Abnormal; Notable for the following components:       Result Value    RBC 3.89 (*)     Hemoglobin 11.3 (*)     Hematocrit 34.3 (*)     RDW 14.6 (*)     All other components within normal limits   COMPREHENSIVE METABOLIC PANEL - Abnormal; Notable for the following components:    Potassium 3.2 (*)     Glucose 128 (*)     Bun/Cre Ratio 9 (*)     AST 9 (*)     All other components within normal limits   TROPONIN   LIPASE   URINALYSIS       All other labs were within normal range or not

## 2023-07-24 LAB
EKG ATRIAL RATE: 109 BPM
EKG DIAGNOSIS: NORMAL
EKG P AXIS: 44 DEGREES
EKG P-R INTERVAL: 154 MS
EKG Q-T INTERVAL: 352 MS
EKG QRS DURATION: 86 MS
EKG QTC CALCULATION (BAZETT): 474 MS
EKG R AXIS: 15 DEGREES
EKG T AXIS: 30 DEGREES
EKG VENTRICULAR RATE: 109 BPM

## 2023-08-09 ENCOUNTER — HOSPITAL ENCOUNTER (EMERGENCY)
Age: 38
Discharge: HOME | End: 2023-08-09
Attending: EMERGENCY MEDICINE
Payer: MEDICAID

## 2023-08-09 ENCOUNTER — APPOINTMENT (OUTPATIENT)
Age: 38
End: 2023-08-09
Payer: MEDICAID

## 2023-08-09 VITALS
RESPIRATION RATE: 17 BRPM | HEIGHT: 67 IN | SYSTOLIC BLOOD PRESSURE: 186 MMHG | TEMPERATURE: 98.5 F | WEIGHT: 250 LBS | BODY MASS INDEX: 39.24 KG/M2 | HEART RATE: 84 BPM | DIASTOLIC BLOOD PRESSURE: 114 MMHG | OXYGEN SATURATION: 100 %

## 2023-08-09 DIAGNOSIS — M43.6 TORTICOLLIS: Primary | ICD-10-CM

## 2023-08-09 PROCEDURE — 99284 EMERGENCY DEPT VISIT MOD MDM: CPT

## 2023-08-09 PROCEDURE — 6360000002 HC RX W HCPCS: Performed by: EMERGENCY MEDICINE

## 2023-08-09 PROCEDURE — 6370000000 HC RX 637 (ALT 250 FOR IP): Performed by: EMERGENCY MEDICINE

## 2023-08-09 PROCEDURE — 96372 THER/PROPH/DIAG INJ SC/IM: CPT

## 2023-08-09 PROCEDURE — 72040 X-RAY EXAM NECK SPINE 2-3 VW: CPT

## 2023-08-09 RX ORDER — ARIPIPRAZOLE 10 MG/1
TABLET ORAL
COMMUNITY
Start: 2023-06-21

## 2023-08-09 RX ORDER — BUSPIRONE HYDROCHLORIDE 5 MG/1
TABLET ORAL
COMMUNITY
Start: 2023-07-10

## 2023-08-09 RX ORDER — OXYCODONE HYDROCHLORIDE AND ACETAMINOPHEN 5; 325 MG/1; MG/1
1 TABLET ORAL
Status: COMPLETED | OUTPATIENT
Start: 2023-08-09 | End: 2023-08-09

## 2023-08-09 RX ORDER — KETOROLAC TROMETHAMINE 10 MG/1
10 TABLET, FILM COATED ORAL EVERY 8 HOURS PRN
Qty: 15 TABLET | Refills: 0 | Status: SHIPPED | OUTPATIENT
Start: 2023-08-09

## 2023-08-09 RX ORDER — AMLODIPINE BESYLATE 10 MG/1
TABLET ORAL
COMMUNITY
Start: 2023-05-20

## 2023-08-09 RX ORDER — KETOROLAC TROMETHAMINE 30 MG/ML
60 INJECTION, SOLUTION INTRAMUSCULAR; INTRAVENOUS
Status: COMPLETED | OUTPATIENT
Start: 2023-08-09 | End: 2023-08-09

## 2023-08-09 RX ORDER — BUPROPION HYDROCHLORIDE 150 MG/1
TABLET ORAL
COMMUNITY
Start: 2023-06-25

## 2023-08-09 RX ORDER — BACLOFEN 10 MG/1
10 TABLET ORAL
Status: COMPLETED | OUTPATIENT
Start: 2023-08-09 | End: 2023-08-09

## 2023-08-09 RX ADMIN — KETOROLAC TROMETHAMINE 60 MG: 30 INJECTION, SOLUTION INTRAMUSCULAR at 18:45

## 2023-08-09 RX ADMIN — BACLOFEN 10 MG: 10 TABLET ORAL at 18:44

## 2023-08-09 RX ADMIN — OXYCODONE AND ACETAMINOPHEN 1 TABLET: 5; 325 TABLET ORAL at 18:44

## 2023-08-09 ASSESSMENT — PAIN DESCRIPTION - LOCATION
LOCATION: NECK
LOCATION: NECK

## 2023-08-09 ASSESSMENT — PAIN - FUNCTIONAL ASSESSMENT: PAIN_FUNCTIONAL_ASSESSMENT: 0-10

## 2023-08-09 ASSESSMENT — PAIN SCALES - GENERAL
PAINLEVEL_OUTOF10: 9
PAINLEVEL_OUTOF10: 9

## 2023-08-09 ASSESSMENT — PAIN DESCRIPTION - ORIENTATION
ORIENTATION: RIGHT;LEFT
ORIENTATION: LEFT;RIGHT

## 2023-08-09 ASSESSMENT — PAIN DESCRIPTION - PAIN TYPE: TYPE: ACUTE PAIN

## 2023-08-09 NOTE — ED TRIAGE NOTES
Pt reports bilateral neck stiffness since yesterday, denies any known injury, reports waking up that way.

## 2023-08-20 ENCOUNTER — HOSPITAL ENCOUNTER (EMERGENCY)
Age: 38
Discharge: HOME | End: 2023-08-20
Attending: FAMILY MEDICINE
Payer: MEDICAID

## 2023-08-20 ENCOUNTER — APPOINTMENT (OUTPATIENT)
Age: 38
End: 2023-08-20
Payer: MEDICAID

## 2023-08-20 VITALS
HEART RATE: 78 BPM | OXYGEN SATURATION: 100 % | BODY MASS INDEX: 39.33 KG/M2 | DIASTOLIC BLOOD PRESSURE: 98 MMHG | SYSTOLIC BLOOD PRESSURE: 147 MMHG | HEIGHT: 67 IN | WEIGHT: 250.6 LBS | TEMPERATURE: 99.4 F | RESPIRATION RATE: 20 BRPM

## 2023-08-20 DIAGNOSIS — R07.9 CHEST PAIN, UNSPECIFIED TYPE: Primary | ICD-10-CM

## 2023-08-20 DIAGNOSIS — I10 ASYMPTOMATIC HYPERTENSION: ICD-10-CM

## 2023-08-20 LAB
ALBUMIN SERPL-MCNC: 3.6 G/DL (ref 3.4–5)
ALBUMIN/GLOB SERPL: 0.8 (ref 0.8–1.7)
ALP SERPL-CCNC: 62 U/L (ref 45–117)
ALT SERPL-CCNC: 21 U/L (ref 13–56)
AMPHET UR QL SCN: NEGATIVE
ANION GAP SERPL CALC-SCNC: 8 MMOL/L (ref 3–18)
APPEARANCE UR: CLEAR
AST SERPL W P-5'-P-CCNC: 8 U/L (ref 10–38)
BACTERIA URNS QL MICRO: ABNORMAL /HPF
BARBITURATES UR QL SCN: NEGATIVE
BASOPHILS # BLD: 0 K/UL (ref 0–0.1)
BASOPHILS NFR BLD: 1 % (ref 0–2)
BENZODIAZ UR QL: NEGATIVE
BILIRUB SERPL-MCNC: 0.3 MG/DL (ref 0.2–1)
BILIRUB UR QL: NEGATIVE
BUN SERPL-MCNC: 15 MG/DL (ref 7–18)
BUN/CREAT SERPL: 16 (ref 12–20)
CA-I BLD-MCNC: 8.9 MG/DL (ref 8.5–10.1)
CANNABINOIDS UR QL SCN: NEGATIVE
CHLORIDE SERPL-SCNC: 104 MMOL/L (ref 100–111)
CO2 SERPL-SCNC: 26 MMOL/L (ref 21–32)
COCAINE UR QL SCN: NEGATIVE
COLOR UR: YELLOW
CREAT SERPL-MCNC: 0.94 MG/DL (ref 0.6–1.3)
DIFFERENTIAL METHOD BLD: NORMAL
EKG ATRIAL RATE: 75 BPM
EKG DIAGNOSIS: NORMAL
EKG P AXIS: 28 DEGREES
EKG P-R INTERVAL: 166 MS
EKG Q-T INTERVAL: 380 MS
EKG QRS DURATION: 84 MS
EKG QTC CALCULATION (BAZETT): 424 MS
EKG R AXIS: 16 DEGREES
EKG T AXIS: 36 DEGREES
EKG VENTRICULAR RATE: 75 BPM
EOSINOPHIL # BLD: 0.1 K/UL (ref 0–0.4)
EOSINOPHIL NFR BLD: 2 % (ref 0–5)
EPITH CASTS URNS QL MICRO: ABNORMAL /LPF (ref 0–20)
ERYTHROCYTE [DISTWIDTH] IN BLOOD BY AUTOMATED COUNT: 14.5 % (ref 11.6–14.5)
FENTANYL UR QL SCN: NEGATIVE
GLOBULIN SER CALC-MCNC: 4.3 G/DL (ref 2–4)
GLUCOSE SERPL-MCNC: 120 MG/DL (ref 74–99)
GLUCOSE UR STRIP.AUTO-MCNC: NEGATIVE MG/DL
HCT VFR BLD AUTO: 39.2 % (ref 35–45)
HGB BLD-MCNC: 12.6 G/DL (ref 12–16)
HGB UR QL STRIP: NEGATIVE
IMM GRANULOCYTES # BLD AUTO: 0 K/UL (ref 0–0.04)
IMM GRANULOCYTES NFR BLD AUTO: 0 % (ref 0–0.5)
KETONES UR QL STRIP.AUTO: NEGATIVE MG/DL
LEUKOCYTE ESTERASE UR QL STRIP.AUTO: NEGATIVE
LYMPHOCYTES # BLD: 2 K/UL (ref 0.9–3.6)
LYMPHOCYTES NFR BLD: 39 % (ref 21–52)
Lab: NORMAL
MAGNESIUM SERPL-MCNC: 2.1 MG/DL (ref 1.6–2.6)
MCH RBC QN AUTO: 28.5 PG (ref 24–34)
MCHC RBC AUTO-ENTMCNC: 32.1 G/DL (ref 31–37)
MCV RBC AUTO: 88.7 FL (ref 78–100)
METHADONE UR QL: NEGATIVE
MONOCYTES # BLD: 0.5 K/UL (ref 0.05–1.2)
MONOCYTES NFR BLD: 9 % (ref 3–10)
MUCOUS THREADS URNS QL MICRO: ABNORMAL /LPF
NEUTS SEG # BLD: 2.5 K/UL (ref 1.8–8)
NEUTS SEG NFR BLD: 49 % (ref 40–73)
NITRITE UR QL STRIP.AUTO: NEGATIVE
NRBC # BLD: 0 K/UL (ref 0–0.01)
NRBC BLD-RTO: 0 PER 100 WBC
OPIATES UR QL: NEGATIVE
OXYCODONE UR QL SCN: NEGATIVE
PCP UR QL: NEGATIVE
PH UR STRIP: 5 (ref 5–8)
PLATELET # BLD AUTO: 371 K/UL (ref 135–420)
PMV BLD AUTO: 10.7 FL (ref 9.2–11.8)
POTASSIUM SERPL-SCNC: 3.6 MMOL/L (ref 3.5–5.5)
PROPOXYPH UR QL: NEGATIVE
PROT SERPL-MCNC: 7.9 G/DL (ref 6.4–8.2)
PROT UR STRIP-MCNC: ABNORMAL MG/DL
RBC # BLD AUTO: 4.42 M/UL (ref 4.2–5.3)
RBC #/AREA URNS HPF: ABNORMAL /HPF (ref 0–2)
SODIUM SERPL-SCNC: 138 MMOL/L (ref 136–145)
SP GR UR REFRACTOMETRY: 1.03 (ref 1–1.03)
TRICHOMONAS UR QL MICRO: PRESENT
TRICYCLICS UR QL: NEGATIVE
TROPONIN I SERPL HS-MCNC: 5 NG/L (ref 0–54)
TSH SERPL DL<=0.05 MIU/L-ACNC: 1.23 UIU/ML (ref 0.36–3.74)
UROBILINOGEN UR QL STRIP.AUTO: 0.2 EU/DL (ref 0.2–1)
WBC # BLD AUTO: 5.1 K/UL (ref 4.6–13.2)
WBC URNS QL MICRO: ABNORMAL /HPF (ref 0–4)

## 2023-08-20 PROCEDURE — 84484 ASSAY OF TROPONIN QUANT: CPT

## 2023-08-20 PROCEDURE — 83735 ASSAY OF MAGNESIUM: CPT

## 2023-08-20 PROCEDURE — 85025 COMPLETE CBC W/AUTO DIFF WBC: CPT

## 2023-08-20 PROCEDURE — 80307 DRUG TEST PRSMV CHEM ANLYZR: CPT

## 2023-08-20 PROCEDURE — 6360000002 HC RX W HCPCS: Performed by: FAMILY MEDICINE

## 2023-08-20 PROCEDURE — 84443 ASSAY THYROID STIM HORMONE: CPT

## 2023-08-20 PROCEDURE — 71045 X-RAY EXAM CHEST 1 VIEW: CPT

## 2023-08-20 PROCEDURE — 99285 EMERGENCY DEPT VISIT HI MDM: CPT

## 2023-08-20 PROCEDURE — 93005 ELECTROCARDIOGRAM TRACING: CPT | Performed by: FAMILY MEDICINE

## 2023-08-20 PROCEDURE — 6370000000 HC RX 637 (ALT 250 FOR IP): Performed by: FAMILY MEDICINE

## 2023-08-20 PROCEDURE — 81001 URINALYSIS AUTO W/SCOPE: CPT

## 2023-08-20 PROCEDURE — 36415 COLL VENOUS BLD VENIPUNCTURE: CPT

## 2023-08-20 PROCEDURE — 96374 THER/PROPH/DIAG INJ IV PUSH: CPT

## 2023-08-20 PROCEDURE — 80053 COMPREHEN METABOLIC PANEL: CPT

## 2023-08-20 RX ORDER — HYDROCHLOROTHIAZIDE 25 MG/1
25 TABLET ORAL DAILY
COMMUNITY

## 2023-08-20 RX ORDER — KETOROLAC TROMETHAMINE 30 MG/ML
30 INJECTION, SOLUTION INTRAMUSCULAR; INTRAVENOUS ONCE
Status: COMPLETED | OUTPATIENT
Start: 2023-08-20 | End: 2023-08-20

## 2023-08-20 RX ORDER — BACLOFEN 10 MG/1
20 TABLET ORAL
Status: COMPLETED | OUTPATIENT
Start: 2023-08-20 | End: 2023-08-20

## 2023-08-20 RX ORDER — ASPIRIN 325 MG
325 TABLET ORAL
Status: CANCELLED | OUTPATIENT
Start: 2023-08-20 | End: 2023-08-20

## 2023-08-20 RX ADMIN — KETOROLAC TROMETHAMINE 30 MG: 30 INJECTION, SOLUTION INTRAMUSCULAR at 06:34

## 2023-08-20 RX ADMIN — BACLOFEN 20 MG: 10 TABLET ORAL at 06:36

## 2023-08-20 ASSESSMENT — PAIN - FUNCTIONAL ASSESSMENT
PAIN_FUNCTIONAL_ASSESSMENT: NONE - DENIES PAIN
PAIN_FUNCTIONAL_ASSESSMENT: 0-10

## 2023-08-20 ASSESSMENT — PAIN DESCRIPTION - DESCRIPTORS
DESCRIPTORS: TIGHTNESS;SHARP
DESCRIPTORS: TIGHTNESS;ACHING

## 2023-08-20 ASSESSMENT — PAIN DESCRIPTION - LOCATION
LOCATION: BACK;CHEST
LOCATION: CHEST;NECK;BACK

## 2023-08-20 ASSESSMENT — PAIN SCALES - GENERAL
PAINLEVEL_OUTOF10: 8
PAINLEVEL_OUTOF10: 8
PAINLEVEL_OUTOF10: 0

## 2023-08-20 ASSESSMENT — PAIN DESCRIPTION - FREQUENCY: FREQUENCY: CONTINUOUS

## 2023-08-20 ASSESSMENT — ENCOUNTER SYMPTOMS
GASTROINTESTINAL NEGATIVE: 1
CHEST TIGHTNESS: 1
EYES NEGATIVE: 1

## 2023-08-20 ASSESSMENT — PAIN DESCRIPTION - ORIENTATION: ORIENTATION: LEFT

## 2023-08-20 ASSESSMENT — PAIN DESCRIPTION - PAIN TYPE: TYPE: CHRONIC PAIN

## 2023-08-20 NOTE — ED TRIAGE NOTES
Reports continued chest tightness and pain in left side of neck and down left side of back. Reports has been going on for about 3 weeks but continues. Tonight symptoms got worse, has been seen at multiple facilities for same, diagnosed with arthritis. Denies cough, no nausea or vomiting. Does report one episode of diarrhea.

## 2023-08-20 NOTE — ED PROVIDER NOTES
[facility] EMERGENCY DEPT  EMERGENCY DEPARTMENT ENCOUNTER      Pt Name: Yohan Toney  MRN: [id number]  [address] [facility] 1985  Date of evaluation: 8/20/2023  Provider: Cameron De Dios MD    CHIEF COMPLAINT       Chief Complaint   Patient presents with    Chest Pain         HISTORY OF PRESENT ILLNESS   (Location/Symptom, Timing/Onset, Context/Setting, Quality, Duration, Modifying Factors, Severity)  Note limiting factors. Yohan Toney is a 41 y.o. female who presents to the emergency department chest tightness, neck pain     Patient presents to the ED for a 3 week history of chest tightness and neck pain. Symptoms began approx 3 weeks ago, no inciting event. Nothing makes the chest tightness better or worse. The neck pain/tightness is worse with movement and palpation, better with lidoderm patch and baclofen. She has been seen numerous times by our ED and [facility] ED for these symptoms over the last 3 weeks. She was seen at [address] [place] [address] yesterday for the same symptoms, had EKG done and medications prescribed. She has come to our ED because she is certain something is going on and she thinks it is her thyroid. She has not seen her Primary Doctor, states she tried calling but no one answered. No fever, chills, ENT symptoms, palpitations, cough, n/v, abdominal pain, numbness, tingling, headache, dizziness or syncope    The history is provided by the patient and medical records. Nursing Notes were reviewed. REVIEW OF SYSTEMS    (2-9 systems for level 4, 10 or more for level 5)     Review of Systems   Constitutional: Negative. HENT: Negative. Eyes: Negative. Respiratory:  Positive for chest tightness. Cardiovascular:  Negative for palpitations and leg swelling. Gastrointestinal: Negative. Genitourinary: Negative. Musculoskeletal:  Positive for neck pain. Skin: Negative. Neurological: Negative. All other systems reviewed and are negative.     Except as noted above the remainder of the review

## 2023-08-20 NOTE — ED NOTES
Bedside and Verbal shift change report given to CHRISTIAN Kennedy (oncoming nurse) by Blossom Feliciano (offgoing nurse). Report included the following information Nurse Handoff Report, ED Encounter Summary, and MAR.        Rosa Nolasco RN  08/20/23 [id number]

## 2023-09-08 ENCOUNTER — HOSPITAL ENCOUNTER (EMERGENCY)
Age: 38
Discharge: HOME | End: 2023-09-08
Attending: INTERNAL MEDICINE
Payer: MEDICAID

## 2023-09-08 VITALS
OXYGEN SATURATION: 100 % | BODY MASS INDEX: 39.08 KG/M2 | TEMPERATURE: 98.1 F | RESPIRATION RATE: 16 BRPM | HEIGHT: 67 IN | DIASTOLIC BLOOD PRESSURE: 87 MMHG | WEIGHT: 249 LBS | SYSTOLIC BLOOD PRESSURE: 158 MMHG | HEART RATE: 72 BPM

## 2023-09-08 DIAGNOSIS — F41.0 PANIC ATTACK: Primary | ICD-10-CM

## 2023-09-08 LAB
APPEARANCE UR: NORMAL
BILIRUB UR QL: NEGATIVE
COLOR UR: YELLOW
GLUCOSE UR STRIP.AUTO-MCNC: NEGATIVE MG/DL
HCG UR QL: NEGATIVE
HGB UR QL STRIP: NEGATIVE
KETONES UR QL STRIP.AUTO: NEGATIVE MG/DL
LEUKOCYTE ESTERASE UR QL STRIP.AUTO: NEGATIVE
NITRITE UR QL STRIP.AUTO: NEGATIVE
PH UR STRIP: 7.5 (ref 5–8)
PROT UR STRIP-MCNC: NEGATIVE MG/DL
SP GR UR REFRACTOMETRY: 1.01 (ref 1–1.03)
UROBILINOGEN UR QL STRIP.AUTO: 0.2 EU/DL (ref 0.2–1)

## 2023-09-08 PROCEDURE — 99283 EMERGENCY DEPT VISIT LOW MDM: CPT

## 2023-09-08 PROCEDURE — 81025 URINE PREGNANCY TEST: CPT

## 2023-09-08 PROCEDURE — 81003 URINALYSIS AUTO W/O SCOPE: CPT

## 2023-09-08 RX ORDER — CYCLOBENZAPRINE HCL 5 MG
5 TABLET ORAL 3 TIMES DAILY
COMMUNITY
Start: 2023-08-19

## 2023-09-08 RX ORDER — CLONIDINE HYDROCHLORIDE 0.2 MG/1
0.2 TABLET ORAL 2 TIMES DAILY
COMMUNITY

## 2023-09-08 ASSESSMENT — ENCOUNTER SYMPTOMS
SORE THROAT: 0
ABDOMINAL PAIN: 0
COUGH: 0
WHEEZING: 0
SHORTNESS OF BREATH: 1

## 2023-09-08 ASSESSMENT — PAIN SCALES - GENERAL: PAINLEVEL_OUTOF10: 8

## 2023-09-08 ASSESSMENT — PAIN DESCRIPTION - LOCATION: LOCATION: CHEST

## 2023-09-08 ASSESSMENT — PAIN DESCRIPTION - DESCRIPTORS: DESCRIPTORS: TIGHTNESS

## 2023-09-08 ASSESSMENT — PAIN - FUNCTIONAL ASSESSMENT: PAIN_FUNCTIONAL_ASSESSMENT: 0-10

## 2023-09-08 NOTE — ED TRIAGE NOTES
Pt brought to ED via EMS. Pt states she feels like she can't get her breath under control. Pt states she has a hx of anxiety, and she took her anxiety med this am  Pt has multiple complaints, but mostly feels like she can't catch her breath good   Pt started a new BP med about a week ago, states she has had to have it increased already because it is not working  Pt has been seen by PCP several times recently for blood pressure checks, states it has been high at every visit.

## 2023-09-08 NOTE — ED PROVIDER NOTES
[facility] EMERGENCY DEPT  EMERGENCY DEPARTMENT ENCOUNTER      Pt Name: Elli Akbar  MRN: [id number]  [address] [facility] 1985  Date of evaluation: 9/8/2023  Provider: Erasto Vidal MD    [address]       Chief Complaint   Patient presents with    Other     Anxiety, shortness of breath, rash, high blood pressure         HISTORY OF PRESENT ILLNESS   (Location/Symptom, Timing/Onset, Context/Setting, Quality, Duration, Modifying Factors, Severity)  Note limiting factors. Elli Akbar is a 41 y.o. female who presents to the emergency department      70-year-old female with history of anxiety, bipolar disorder, depression, hypertension, hyperlipidemia, hypothyroid, migraines, panic attacks, obesity, goiter that states that she was short of breath last week but she went to sleep and went away. She states that today she got up 8:00 to take her child to the schoolbus and became short of breath and she has been short of breath since. She feels that is related to her anxiety. She thought it might be related to her asthma. She feels like she might have been starting to get a panic attack. She states that she saw her PCP Dr. Mary Kay Wang last week and was put on clonidine 0.1 twice daily for blood pressure and that she was seen a few days ago and was increased to 0.2 mg twice daily. She denies fever or chills. She states occasional chest pains. She feels that she has been urinating more than normal and wonders if she has a UTI. The history is provided by the patient. Nursing Notes were reviewed. REVIEW OF SYSTEMS    (2-9 systems for level 4, 10 or more for level 5)     Review of Systems   Constitutional:  Negative for chills and fever. HENT:  Negative for sore throat. Respiratory:  Positive for shortness of breath. Negative for cough and wheezing. Cardiovascular:  Negative for chest pain. Gastrointestinal:  Negative for abdominal pain. Genitourinary:  Positive for frequency.  Negative for

## 2023-09-11 ENCOUNTER — HOSPITAL ENCOUNTER (EMERGENCY)
Age: 38
Discharge: HOME | End: 2023-09-11
Attending: EMERGENCY MEDICINE
Payer: MEDICAID

## 2023-09-11 VITALS
WEIGHT: 249 LBS | TEMPERATURE: 97.9 F | RESPIRATION RATE: 17 BRPM | BODY MASS INDEX: 39.08 KG/M2 | DIASTOLIC BLOOD PRESSURE: 89 MMHG | HEART RATE: 73 BPM | SYSTOLIC BLOOD PRESSURE: 157 MMHG | OXYGEN SATURATION: 100 % | HEIGHT: 67 IN

## 2023-09-11 DIAGNOSIS — F41.0 PANIC ATTACK: Primary | ICD-10-CM

## 2023-09-11 PROCEDURE — 99283 EMERGENCY DEPT VISIT LOW MDM: CPT

## 2023-09-11 RX ORDER — LOSARTAN POTASSIUM 25 MG/1
TABLET ORAL
COMMUNITY
Start: 2023-07-28

## 2023-09-11 RX ORDER — IBUPROFEN 800 MG/1
TABLET ORAL
COMMUNITY
Start: 2023-08-19

## 2023-09-11 RX ORDER — BACLOFEN 10 MG/1
TABLET ORAL
COMMUNITY
Start: 2023-08-10

## 2023-09-11 RX ORDER — TRIAMCINOLONE ACETONIDE 1 MG/G
CREAM TOPICAL
COMMUNITY
Start: 2023-08-19

## 2023-09-11 RX ORDER — HYDRALAZINE HYDROCHLORIDE 100 MG/1
TABLET, FILM COATED ORAL
COMMUNITY
Start: 2023-07-24

## 2023-09-11 RX ORDER — HYDROXYZINE HYDROCHLORIDE 25 MG/1
25 TABLET, FILM COATED ORAL 4 TIMES DAILY PRN
Qty: 5 TABLET | Refills: 0 | Status: SHIPPED | OUTPATIENT
Start: 2023-09-11 | End: 2023-09-11 | Stop reason: SDUPTHER

## 2023-09-11 RX ORDER — DILTIAZEM HYDROCHLORIDE 120 MG/1
CAPSULE, COATED, EXTENDED RELEASE ORAL
COMMUNITY
Start: 2023-07-26

## 2023-09-11 RX ORDER — LIDOCAINE 50 MG/G
PATCH TOPICAL
COMMUNITY
Start: 2023-08-19

## 2023-09-11 RX ORDER — HYDROXYZINE HYDROCHLORIDE 25 MG/1
25 TABLET, FILM COATED ORAL 4 TIMES DAILY PRN
Qty: 5 TABLET | Refills: 0 | Status: SHIPPED | OUTPATIENT
Start: 2023-09-11

## 2023-09-11 ASSESSMENT — PAIN - FUNCTIONAL ASSESSMENT: PAIN_FUNCTIONAL_ASSESSMENT: 0-10

## 2023-09-11 ASSESSMENT — PAIN DESCRIPTION - LOCATION: LOCATION: BACK

## 2023-09-11 ASSESSMENT — PAIN DESCRIPTION - PAIN TYPE: TYPE: ACUTE PAIN

## 2023-09-11 ASSESSMENT — PAIN SCALES - GENERAL: PAINLEVEL_OUTOF10: 4

## 2023-09-11 NOTE — ED TRIAGE NOTES
Pt reports recurrent panic attacks, states her pcp was prescribing her anxiety medications but she is having a hard time getting in to see her pcp. States she is having a hard time sleeping at night. Reports she thinks the flare in panic attacks are related to financial aid problems with school.

## 2023-09-11 NOTE — DISCHARGE INSTRUCTIONS
Try the following breathing exercises to help reduce panic:    Sit in a quiet room  Take a deep breath, hold your breath for 2 seconds, then exhale slowly  Do this for 2 minutes without interruption

## 2023-09-20 ENCOUNTER — APPOINTMENT (OUTPATIENT)
Age: 38
End: 2023-09-20
Payer: MEDICAID

## 2023-09-20 ENCOUNTER — HOSPITAL ENCOUNTER (EMERGENCY)
Age: 38
Discharge: HOME | End: 2023-09-20
Attending: FAMILY MEDICINE
Payer: MEDICAID

## 2023-09-20 VITALS
RESPIRATION RATE: 16 BRPM | SYSTOLIC BLOOD PRESSURE: 157 MMHG | HEART RATE: 68 BPM | DIASTOLIC BLOOD PRESSURE: 94 MMHG | BODY MASS INDEX: 37.83 KG/M2 | WEIGHT: 241 LBS | TEMPERATURE: 98.7 F | HEIGHT: 67 IN | OXYGEN SATURATION: 100 %

## 2023-09-20 DIAGNOSIS — F41.1 ANXIETY STATE: ICD-10-CM

## 2023-09-20 DIAGNOSIS — R00.2 PALPITATIONS: Primary | ICD-10-CM

## 2023-09-20 DIAGNOSIS — R07.89 ATYPICAL CHEST PAIN: ICD-10-CM

## 2023-09-20 DIAGNOSIS — K21.9 GASTROESOPHAGEAL REFLUX DISEASE, UNSPECIFIED WHETHER ESOPHAGITIS PRESENT: ICD-10-CM

## 2023-09-20 LAB
ANION GAP SERPL CALC-SCNC: 8 MMOL/L (ref 3–18)
BASOPHILS # BLD: 0 K/UL (ref 0–0.1)
BASOPHILS NFR BLD: 1 % (ref 0–2)
BUN SERPL-MCNC: 8 MG/DL (ref 7–18)
BUN/CREAT SERPL: 10 (ref 12–20)
CA-I BLD-MCNC: 9.5 MG/DL (ref 8.5–10.1)
CHLORIDE SERPL-SCNC: 108 MMOL/L (ref 100–111)
CO2 SERPL-SCNC: 24 MMOL/L (ref 21–32)
CREAT SERPL-MCNC: 0.82 MG/DL (ref 0.6–1.3)
DIFFERENTIAL METHOD BLD: ABNORMAL
EOSINOPHIL # BLD: 0.1 K/UL (ref 0–0.4)
EOSINOPHIL NFR BLD: 2 % (ref 0–5)
ERYTHROCYTE [DISTWIDTH] IN BLOOD BY AUTOMATED COUNT: 13.9 % (ref 11.6–14.5)
GLUCOSE SERPL-MCNC: 107 MG/DL (ref 74–99)
HCT VFR BLD AUTO: 35.3 % (ref 35–45)
HGB BLD-MCNC: 11.8 G/DL (ref 12–16)
IMM GRANULOCYTES # BLD AUTO: 0 K/UL (ref 0–0.04)
IMM GRANULOCYTES NFR BLD AUTO: 0 % (ref 0–0.5)
LYMPHOCYTES # BLD: 1.8 K/UL (ref 0.9–3.6)
LYMPHOCYTES NFR BLD: 34 % (ref 21–52)
MCH RBC QN AUTO: 29.1 PG (ref 24–34)
MCHC RBC AUTO-ENTMCNC: 33.4 G/DL (ref 31–37)
MCV RBC AUTO: 87.2 FL (ref 78–100)
MONOCYTES # BLD: 0.4 K/UL (ref 0.05–1.2)
MONOCYTES NFR BLD: 7 % (ref 3–10)
NEUTS SEG # BLD: 3 K/UL (ref 1.8–8)
NEUTS SEG NFR BLD: 56 % (ref 40–73)
NRBC # BLD: 0 K/UL (ref 0–0.01)
NRBC BLD-RTO: 0 PER 100 WBC
PLATELET # BLD AUTO: 328 K/UL (ref 135–420)
PMV BLD AUTO: 10.8 FL (ref 9.2–11.8)
POTASSIUM SERPL-SCNC: 3.7 MMOL/L (ref 3.5–5.5)
RBC # BLD AUTO: 4.05 M/UL (ref 4.2–5.3)
SODIUM SERPL-SCNC: 140 MMOL/L (ref 136–145)
TROPONIN I SERPL HS-MCNC: 5 NG/L (ref 0–54)
TROPONIN I SERPL HS-MCNC: 6 NG/L (ref 0–54)
WBC # BLD AUTO: 5.3 K/UL (ref 4.6–13.2)

## 2023-09-20 PROCEDURE — 6370000000 HC RX 637 (ALT 250 FOR IP): Performed by: FAMILY MEDICINE

## 2023-09-20 PROCEDURE — 71045 X-RAY EXAM CHEST 1 VIEW: CPT

## 2023-09-20 PROCEDURE — 84484 ASSAY OF TROPONIN QUANT: CPT

## 2023-09-20 PROCEDURE — 93005 ELECTROCARDIOGRAM TRACING: CPT | Performed by: FAMILY MEDICINE

## 2023-09-20 PROCEDURE — 96374 THER/PROPH/DIAG INJ IV PUSH: CPT

## 2023-09-20 PROCEDURE — 6360000002 HC RX W HCPCS: Performed by: FAMILY MEDICINE

## 2023-09-20 PROCEDURE — 99285 EMERGENCY DEPT VISIT HI MDM: CPT

## 2023-09-20 PROCEDURE — 85025 COMPLETE CBC W/AUTO DIFF WBC: CPT

## 2023-09-20 PROCEDURE — 80048 BASIC METABOLIC PNL TOTAL CA: CPT

## 2023-09-20 RX ORDER — ONDANSETRON 2 MG/ML
4 INJECTION INTRAMUSCULAR; INTRAVENOUS
Status: COMPLETED | OUTPATIENT
Start: 2023-09-20 | End: 2023-09-20

## 2023-09-20 RX ORDER — CLONIDINE HYDROCHLORIDE 0.1 MG/1
0.2 TABLET ORAL
Status: COMPLETED | OUTPATIENT
Start: 2023-09-20 | End: 2023-09-20

## 2023-09-20 RX ORDER — MAGNESIUM HYDROXIDE/ALUMINUM HYDROXICE/SIMETHICONE 120; 1200; 1200 MG/30ML; MG/30ML; MG/30ML
30 SUSPENSION ORAL
Status: COMPLETED | OUTPATIENT
Start: 2023-09-20 | End: 2023-09-20

## 2023-09-20 RX ADMIN — CLONIDINE HYDROCHLORIDE 0.2 MG: 0.1 TABLET ORAL at 18:08

## 2023-09-20 RX ADMIN — ONDANSETRON 4 MG: 2 INJECTION INTRAMUSCULAR; INTRAVENOUS at 18:09

## 2023-09-20 RX ADMIN — ALUMINUM HYDROXIDE, MAGNESIUM HYDROXIDE, AND SIMETHICONE 30 ML: 200; 200; 20 SUSPENSION ORAL at 19:42

## 2023-09-20 ASSESSMENT — PAIN SCALES - GENERAL: PAINLEVEL_OUTOF10: 7

## 2023-09-20 ASSESSMENT — PAIN DESCRIPTION - DESCRIPTORS: DESCRIPTORS: POUNDING

## 2023-09-20 ASSESSMENT — PAIN - FUNCTIONAL ASSESSMENT: PAIN_FUNCTIONAL_ASSESSMENT: 0-10

## 2023-09-20 ASSESSMENT — PAIN DESCRIPTION - ORIENTATION: ORIENTATION: RIGHT

## 2023-09-20 ASSESSMENT — PAIN DESCRIPTION - LOCATION: LOCATION: CHEST

## 2023-09-20 ASSESSMENT — ENCOUNTER SYMPTOMS: SHORTNESS OF BREATH: 1

## 2023-09-20 NOTE — ED TRIAGE NOTES
EMS called to residence for pt that c/o cp. Pt states that she has chest, shoulder, abdominal, neck pain and also states that she has felt dizzy all starting yesterday.  When asked by nurse which is her main c/o bringing her to [facility] she states her chest, pt has several meds ordered for anxiety but did not take any meds today for s/s

## 2023-09-20 NOTE — ED NOTES
Bedside shift change report given to hugo (oncoming nurse) by Keyanna Adamson (offgoing nurse). Report included the following information Nurse Handoff Report.        Devante Dalton LPN  61/10/89 [id number]

## 2023-09-20 NOTE — ED PROVIDER NOTES
[facility] EMERGENCY DEPT  EMERGENCY DEPARTMENT ENCOUNTER      Pt Name: Tri Martin  MRN: [id number]  [address] [facility] 1985  Date of evaluation: 9/20/2023  Provider: Tej Logan       Chief Complaint   Patient presents with    Anxiety    Palpitations         HISTORY OF PRESENT ILLNESS   (Location/Symptom, Timing/Onset, Context/Setting, Quality, Duration, Modifying Factors, Severity)  Note limiting factors. Tri Martin is a 41 y.o. female who presents to the emergency department patient comes in via EMS, has a history of anxiety. She states that she is having some chest pain that feels different she calls it a pounding in her chest stays right there does not radiate anywhere. Has not take anything for this. She denies smoking. Denies any nausea vomiting. Did state that she had some shortness of breath earlier as well as some nausea and vomiting. Has not taken anything for this. She states this started 2 days ago. States that she is on clonidine 0.2 mg she has not taken that dose today. Patient states that she has a history of reflux, has to wear a sleep apnea machine, and has acknowledging for anxiety but this is different she states. HPI    Nursing Notes were reviewed. REVIEW OF SYSTEMS    (2-9 systems for level 4, 10 or more for level 5)     Review of Systems   Respiratory:  Positive for shortness of breath. Cardiovascular:  Positive for chest pain. Genitourinary:  Negative for dysuria. Neurological:  Negative for numbness and headaches. All other systems reviewed and are negative. Except as noted above the remainder of the review of systems was reviewed and negative.        PAST MEDICAL HISTORY     Past Medical History:   Diagnosis Date    Anxiety     Bipolar II disorder ([address]) 6/26/2018    Depression     Essential hypertension 1/30/2020    Fatigue 7/28/2020    Gastroesophageal reflux disease without esophagitis 1/30/2020    Goiter 6/29/2020    Hyperlipidemia

## 2023-09-21 NOTE — DISCHARGE INSTRUCTIONS
Follow up with your Primary Doctor. Consider seeing Cardiology for outpatient heart monitoring.  Return to the ED for new or worsening symptoms

## 2023-09-22 LAB
EKG ATRIAL RATE: 78 BPM
EKG DIAGNOSIS: NORMAL
EKG P AXIS: 20 DEGREES
EKG P-R INTERVAL: 162 MS
EKG Q-T INTERVAL: 374 MS
EKG QRS DURATION: 86 MS
EKG QTC CALCULATION (BAZETT): 426 MS
EKG R AXIS: 51 DEGREES
EKG T AXIS: 31 DEGREES
EKG VENTRICULAR RATE: 78 BPM

## 2023-10-13 ENCOUNTER — TELEPHONE (OUTPATIENT)
Facility: CLINIC | Age: 38
End: 2023-10-13

## 2023-10-13 NOTE — TELEPHONE ENCOUNTER
Left voice message for pt to call office. PCP needs to be updated with [facility] before appt, they have listed as Dr. Thea Bence who is not with [address] [place] [address].

## 2023-10-17 ENCOUNTER — OFFICE VISIT (OUTPATIENT)
Facility: CLINIC | Age: 38
End: 2023-10-17
Payer: MEDICAID

## 2023-10-17 VITALS
BODY MASS INDEX: 39.5 KG/M2 | SYSTOLIC BLOOD PRESSURE: 159 MMHG | RESPIRATION RATE: 20 BRPM | TEMPERATURE: 98 F | DIASTOLIC BLOOD PRESSURE: 91 MMHG | WEIGHT: 245.8 LBS | HEIGHT: 66 IN

## 2023-10-17 DIAGNOSIS — E03.9 HYPOTHYROIDISM, UNSPECIFIED TYPE: ICD-10-CM

## 2023-10-17 DIAGNOSIS — E78.2 HYPERLIPIDEMIA, MIXED: ICD-10-CM

## 2023-10-17 DIAGNOSIS — D64.9 ANEMIA, UNSPECIFIED TYPE: ICD-10-CM

## 2023-10-17 DIAGNOSIS — R73.03 PREDIABETES: ICD-10-CM

## 2023-10-17 DIAGNOSIS — G47.33 OSA ON CPAP: ICD-10-CM

## 2023-10-17 DIAGNOSIS — I10 HYPERTENSION, UNSPECIFIED TYPE: Primary | ICD-10-CM

## 2023-10-17 DIAGNOSIS — F20.9 SCHIZOPHRENIA, UNSPECIFIED TYPE (HCC): ICD-10-CM

## 2023-10-17 DIAGNOSIS — Z51.81 ENCOUNTER FOR MONITORING LONG-TERM PROTON PUMP INHIBITOR THERAPY: ICD-10-CM

## 2023-10-17 DIAGNOSIS — F31.70 BIPOLAR DISORDER IN FULL REMISSION, MOST RECENT EPISODE UNSPECIFIED TYPE (HCC): ICD-10-CM

## 2023-10-17 DIAGNOSIS — E55.9 VITAMIN D DEFICIENCY: ICD-10-CM

## 2023-10-17 DIAGNOSIS — I47.10 SVT (SUPRAVENTRICULAR TACHYCARDIA): ICD-10-CM

## 2023-10-17 DIAGNOSIS — E04.1 RIGHT THYROID NODULE: ICD-10-CM

## 2023-10-17 DIAGNOSIS — Z79.899 ENCOUNTER FOR MONITORING LONG-TERM PROTON PUMP INHIBITOR THERAPY: ICD-10-CM

## 2023-10-17 PROCEDURE — 99205 OFFICE O/P NEW HI 60 MIN: CPT | Performed by: FAMILY MEDICINE

## 2023-10-17 PROCEDURE — 3080F DIAST BP >= 90 MM HG: CPT | Performed by: FAMILY MEDICINE

## 2023-10-17 PROCEDURE — 3077F SYST BP >= 140 MM HG: CPT | Performed by: FAMILY MEDICINE

## 2023-10-17 RX ORDER — MELATONIN
1000 DAILY
Qty: 90 TABLET | Refills: 0 | Status: SHIPPED | OUTPATIENT
Start: 2023-10-17

## 2023-10-17 RX ORDER — IBUPROFEN 800 MG/1
800 TABLET ORAL EVERY 6 HOURS PRN
COMMUNITY

## 2023-10-17 RX ORDER — CLONIDINE HYDROCHLORIDE 0.3 MG/1
0.3 TABLET ORAL 2 TIMES DAILY PRN
COMMUNITY

## 2023-10-17 RX ORDER — DILTIAZEM HYDROCHLORIDE 120 MG/1
120 CAPSULE, EXTENDED RELEASE ORAL DAILY
COMMUNITY
Start: 2023-09-22

## 2023-10-17 SDOH — ECONOMIC STABILITY: FOOD INSECURITY: WITHIN THE PAST 12 MONTHS, YOU WORRIED THAT YOUR FOOD WOULD RUN OUT BEFORE YOU GOT MONEY TO BUY MORE.: OFTEN TRUE

## 2023-10-17 SDOH — ECONOMIC STABILITY: FOOD INSECURITY: WITHIN THE PAST 12 MONTHS, THE FOOD YOU BOUGHT JUST DIDN'T LAST AND YOU DIDN'T HAVE MONEY TO GET MORE.: NEVER TRUE

## 2023-10-17 SDOH — ECONOMIC STABILITY: HOUSING INSECURITY
IN THE LAST 12 MONTHS, WAS THERE A TIME WHEN YOU DID NOT HAVE A STEADY PLACE TO SLEEP OR SLEPT IN A SHELTER (INCLUDING NOW)?: NO

## 2023-10-17 SDOH — ECONOMIC STABILITY: INCOME INSECURITY: HOW HARD IS IT FOR YOU TO PAY FOR THE VERY BASICS LIKE FOOD, HOUSING, MEDICAL CARE, AND HEATING?: SOMEWHAT HARD

## 2023-10-17 ASSESSMENT — PATIENT HEALTH QUESTIONNAIRE - PHQ9
SUM OF ALL RESPONSES TO PHQ QUESTIONS 1-9: 0
SUM OF ALL RESPONSES TO PHQ QUESTIONS 1-9: 0
1. LITTLE INTEREST OR PLEASURE IN DOING THINGS: 0
SUM OF ALL RESPONSES TO PHQ QUESTIONS 1-9: 0
SUM OF ALL RESPONSES TO PHQ9 QUESTIONS 1 & 2: 0
2. FEELING DOWN, DEPRESSED OR HOPELESS: 0
SUM OF ALL RESPONSES TO PHQ QUESTIONS 1-9: 0

## 2023-10-17 ASSESSMENT — ENCOUNTER SYMPTOMS
NAUSEA: 0
COUGH: 0
TROUBLE SWALLOWING: 0
SHORTNESS OF BREATH: 1
DIARRHEA: 1
VOMITING: 0
CONSTIPATION: 0
ABDOMINAL PAIN: 0
BLOOD IN STOOL: 0

## 2023-10-17 NOTE — PROGRESS NOTES
Patient here today for main concern high blood pressure starting in June 2023. Patient goes to [facility] for Bipolar and Schizophrenia. Chief Complaint   Patient presents with    New Patient     1. \"Have you been to the ER, urgent care clinic since your last visit? Hospitalized since your last visit? \" Yes Where: [facility]    2. \"Have you seen or consulted any other health care providers outside of the [address] [place] [address] since your last visit? \" Yes Where: [facility]      3. For patients aged 43-73: Has the patient had a colonoscopy / FIT/ Cologuard? NA - based on age      If the patient is female:    4. For patients aged 43-66: Has the patient had a mammogram within the past 2 years? NA - based on age or sex      11. For patients aged 21-65: Has the patient had a pap smear?  Yes [facility]
Devin Weir agrees with plan as above and has no additional questions at this time. SUBJECTIVE/OBJECTIVE:    HTN:  Pt has not taken her medication yet today. Med: Clonidine 0.3mg BID, Diltiazem XR 120mg QAM.    SVT:  Pt follows / [facility]. Pt recently completed Holter monitor and started on Diltiazem. Plans for stress test tomorrow. Hypothyroidism:   Meds: Levothyroxine 50mcg QAM- on an empty belly. Bipolar, Schizophrenia, depression, anxiety:  Mood is \"fine as long as I'm on my med\". She follows [facility]. Meds: Aripiprazole 10mg QHS, Buppropion 150mg QAD, Buspar 5mg QAM or BID. Arthritis:  She takes Ibuprofen PRN. GERD:  Symptoms can be bad, well controlled on PPI. UVALDO on CPAP. She takes Vit D on occasion. Multiple allergies. Review of Systems   Constitutional:  Positive for fatigue (chronic). Negative for chills and fever. HENT:  Positive for dental problem (Some missing teeth). Negative for hearing loss and trouble swallowing. Eyes:  Negative for visual disturbance. Photophobia: Wears corrective lenses. Respiratory:  Positive for shortness of breath (On occasion). Negative for cough. Cardiovascular:  Positive for palpitations (On occasion). Negative for chest pain. Gastrointestinal:  Positive for diarrhea (On occasion). Negative for abdominal pain, blood in stool, constipation, nausea and vomiting. Genitourinary:  Negative for hematuria. Skin:  Positive for rash (Hx eczema). Neurological:  Positive for light-headedness (On occasion, orthostasis). Negative for seizures and syncope. Psychiatric/Behavioral:  Positive for sleep disturbance. Negative for suicidal ideas. The patient is nervous/anxious.           Past Medical History:   Diagnosis Date    Anxiety     Bipolar II disorder ([address]) 6/26/2018    Depression     Essential hypertension 1/30/2020    Fatigue 7/28/2020    Gastroesophageal reflux disease without esophagitis 1/30/2020

## 2023-10-17 NOTE — PATIENT INSTRUCTIONS
FOOD RESOURCES    Nick Weir of [facility]  What they offer: Assistance with finding a food pantry, soup kitchen or resource near you. Website: https://citizenmade.org/get-help/community-resources/  Provide instructions for assistance with Irene Cruz (Food [place]) application on this site. SNAP ([address])  What they offer: SNAP is used like cash to buy eligible food items from authorized retailers. Apply for benefits online:   Apply for benefits by telephone: [phone]    Meals on Wheels  What they offer: Meals on Wheels is a program that delivers meals to individuals at home who are unable to purchase or prepare their own meals. Website: https://[employer]. org/how-we-help/meals-on-wheels/  Requirements can vary by program and areas served. To apply:  [facility] 349: [phone] (PKN -Fri 8:30 a.m. to 4:30 p.m.)  Coverage Area:  Cj Wilburn, Sean, Jane, [address] [place] [address], Lazaro Jeffries, [place], [address]  Website: www.ssseva.org/contact-us/  [address] On [phone]   Coverage Areas: [facility], [address] [place] [address], [place], [place]. [facility]  What they offer:  [employer] provides comprehensive services to the disadvantaged/low-income community, homebound seniors and homeless in [facility], and [place]. Phone Number: [phone]  Services:  Food pantry, [facility] kitchen, Senior Grocery Delivery Program, Food Bank, Provides assistance with completing [employer]. Website: https://www.Pennant/. org/services    Need additional resources? Call 211 or Find Help: https://www. findhelp.org/

## 2023-10-26 ENCOUNTER — HOSPITAL ENCOUNTER (OUTPATIENT)
Age: 38
Discharge: HOME | End: 2023-10-29

## 2023-10-26 LAB — SENTARA SPECIMEN COLLECTION: NORMAL

## 2023-11-16 ENCOUNTER — OFFICE VISIT (OUTPATIENT)
Facility: CLINIC | Age: 38
End: 2023-11-16

## 2023-11-16 VITALS
HEIGHT: 66 IN | SYSTOLIC BLOOD PRESSURE: 135 MMHG | HEART RATE: 70 BPM | OXYGEN SATURATION: 97 % | RESPIRATION RATE: 18 BRPM | DIASTOLIC BLOOD PRESSURE: 80 MMHG | TEMPERATURE: 96.9 F | BODY MASS INDEX: 39.98 KG/M2 | WEIGHT: 248.8 LBS

## 2023-11-16 DIAGNOSIS — E03.9 HYPOTHYROIDISM, UNSPECIFIED TYPE: ICD-10-CM

## 2023-11-16 DIAGNOSIS — E55.9 VITAMIN D DEFICIENCY: ICD-10-CM

## 2023-11-16 DIAGNOSIS — F31.70 BIPOLAR DISORDER IN FULL REMISSION, MOST RECENT EPISODE UNSPECIFIED TYPE (HCC): ICD-10-CM

## 2023-11-16 DIAGNOSIS — Z23 FLU VACCINE NEED: ICD-10-CM

## 2023-11-16 DIAGNOSIS — F20.9 SCHIZOPHRENIA, UNSPECIFIED TYPE (HCC): ICD-10-CM

## 2023-11-16 DIAGNOSIS — R73.03 PREDIABETES: ICD-10-CM

## 2023-11-16 DIAGNOSIS — E78.2 HYPERLIPIDEMIA, MIXED: ICD-10-CM

## 2023-11-16 DIAGNOSIS — I10 HYPERTENSION, UNSPECIFIED TYPE: Primary | ICD-10-CM

## 2023-11-16 DIAGNOSIS — D64.9 ANEMIA, UNSPECIFIED TYPE: ICD-10-CM

## 2023-11-16 DIAGNOSIS — E04.1 RIGHT THYROID NODULE: ICD-10-CM

## 2023-11-16 LAB
ALBUMIN, URINE, POC: NORMAL MG/L
CREATININE, URINE, POC: NORMAL MG/DL
MICROALB/CREAT RATIO, POC: NORMAL MG/G

## 2023-11-16 RX ORDER — ASCORBIC ACID 250 MG
250 TABLET ORAL DAILY
Qty: 30 TABLET | Refills: 3 | Status: SHIPPED | OUTPATIENT
Start: 2023-11-16

## 2023-11-16 RX ORDER — FERROUS SULFATE 325(65) MG
325 TABLET ORAL EVERY OTHER DAY
Qty: 30 TABLET | Refills: 3 | Status: SHIPPED | OUTPATIENT
Start: 2023-11-16

## 2023-11-16 RX ORDER — ERGOCALCIFEROL 1.25 MG/1
1 CAPSULE ORAL
COMMUNITY

## 2023-11-16 RX ORDER — CETIRIZINE HYDROCHLORIDE 10 MG/1
10 TABLET ORAL DAILY
COMMUNITY

## 2023-11-16 ASSESSMENT — PATIENT HEALTH QUESTIONNAIRE - PHQ9
SUM OF ALL RESPONSES TO PHQ9 QUESTIONS 1 & 2: 0
SUM OF ALL RESPONSES TO PHQ QUESTIONS 1-9: 0
1. LITTLE INTEREST OR PLEASURE IN DOING THINGS: 0
SUM OF ALL RESPONSES TO PHQ QUESTIONS 1-9: 0
2. FEELING DOWN, DEPRESSED OR HOPELESS: 0

## 2023-11-16 NOTE — PROGRESS NOTES
Chief Complaint   Patient presents with    Follow-up     4 week chronic disease   Kelly Monteiro is a 41 y.o. female who presents for routine immunizations. She denies any symptoms , reactions or allergies that would exclude them from being immunized today. Risks and adverse reactions were discussed and the VIS was given to them. All questions were addressed. She was observed for 15 min post injection. There were no reactions observed. Hector Grullon LPN   1. \"Have you been to the ER, urgent care clinic since your last visit? Hospitalized since your last visit? \" No    2. \"Have you seen or consulted any other health care providers outside of the [address] [place] [address] since your last visit? \" No     3. For patients aged 43-73: Has the patient had a colonoscopy / FIT/ Cologuard? NA - based on age      If the patient is female:    4. For patients aged 43-66: Has the patient had a mammogram within the past 2 years? NA - based on age or sex      11. For patients aged 21-65: Has the patient had a pap smear?  Yes - no Care Gap present

## 2023-11-16 NOTE — PROGRESS NOTES
Nikki Kim (: 1985) is a 41 y.o. female here for evaluation of the following chief concern(s):  Chronic condition management     ASSESSMENT/PLAN:  1. Hypertension, unspecified type  -     AMB POC URINE, MICROALBUMIN, SEMIQUANT (3 RESULTS)  2. Anemia, unspecified type  -     ferrous sulfate (IRON 325) 325 (65 Fe) MG tablet; Take 1 tablet by mouth every other day . Take with food. Monitor for constipation. , Disp-30 tablet, R-3Normal  -     ascorbic acid (V-R VITAMIN C) 250 MG tablet; Take 1 tablet by mouth daily . Take with iron to increase absorption. , Disp-30 tablet, R-3Normal  -     Urinalysis with Microscopic; Future  -     Occult Blood Stool Immunoassay; Future  3. Vitamin D deficiency  4. Prediabetes  5. Hyperlipidemia, mixed  6. Hypothyroidism, unspecified type  7. Right thyroid nodule  8. Bipolar disorder in full remission, most recent episode unspecified type ([address])  9. Schizophrenia, unspecified type ([address])  10. Flu vaccine need  -     Influenza, FLUCELVAX, (age 10 mo+), IM, Preservative Free, 0.5 mL    Ms. Pretty appears medically stable, normal hemodynamics including blood pressure. Ualb:Cr today mildly elevated- we discussed likely 2/2 hypertensive renal disease and encouraged medication/CPAP adherence. Pt to start low dose-intermittent iron supps for mild anemia, borderline low ferritin. Pt opts for UA, iFOBT- RTO to pick-up kit. Continue current vit D supplement. Continue low dose Levothyroxine. Pt will work on improving healthy lifestyle for prediabetes, hyperlipidemia; trend profiles ~Q6months. Pt confirms she has number to schedule thyroid ultrasound and will plan to do so before next visit. Mood is stable on psychotropics managed by [facility]. Return in about 3 months (around 2024) for follow-up chronic conditions or sooner if needed, thyroid ultrasound ~2 weeks before next visit.     Nikki Kim agrees with plan as above and has no additional questions

## 2023-12-05 ENCOUNTER — OFFICE VISIT (OUTPATIENT)
Facility: CLINIC | Age: 38
End: 2023-12-05
Payer: MEDICAID

## 2023-12-05 VITALS
WEIGHT: 251.4 LBS | TEMPERATURE: 97.7 F | BODY MASS INDEX: 40.4 KG/M2 | HEART RATE: 72 BPM | HEIGHT: 66 IN | DIASTOLIC BLOOD PRESSURE: 66 MMHG | RESPIRATION RATE: 20 BRPM | SYSTOLIC BLOOD PRESSURE: 119 MMHG | OXYGEN SATURATION: 98 %

## 2023-12-05 DIAGNOSIS — F41.9 ANXIETY: ICD-10-CM

## 2023-12-05 DIAGNOSIS — R73.03 PREDIABETES: ICD-10-CM

## 2023-12-05 DIAGNOSIS — I10 HYPERTENSION, UNSPECIFIED TYPE: Primary | ICD-10-CM

## 2023-12-05 DIAGNOSIS — E55.9 VITAMIN D DEFICIENCY: ICD-10-CM

## 2023-12-05 DIAGNOSIS — E03.9 HYPOTHYROIDISM, UNSPECIFIED TYPE: ICD-10-CM

## 2023-12-05 DIAGNOSIS — K21.9 GASTROESOPHAGEAL REFLUX DISEASE WITHOUT ESOPHAGITIS: ICD-10-CM

## 2023-12-05 DIAGNOSIS — D64.9 ANEMIA, UNSPECIFIED TYPE: ICD-10-CM

## 2023-12-05 DIAGNOSIS — F31.70 BIPOLAR DISORDER IN FULL REMISSION, MOST RECENT EPISODE UNSPECIFIED TYPE (HCC): ICD-10-CM

## 2023-12-05 DIAGNOSIS — F20.9 SCHIZOPHRENIA, UNSPECIFIED TYPE (HCC): ICD-10-CM

## 2023-12-05 PROCEDURE — 3078F DIAST BP <80 MM HG: CPT | Performed by: FAMILY MEDICINE

## 2023-12-05 PROCEDURE — 3074F SYST BP LT 130 MM HG: CPT | Performed by: FAMILY MEDICINE

## 2023-12-05 PROCEDURE — 99214 OFFICE O/P EST MOD 30 MIN: CPT | Performed by: FAMILY MEDICINE

## 2023-12-05 RX ORDER — BUSPIRONE HYDROCHLORIDE 5 MG/1
5 TABLET ORAL 2 TIMES DAILY
Qty: 135 TABLET | Refills: 1 | Status: SHIPPED | OUTPATIENT
Start: 2023-12-05

## 2023-12-05 RX ORDER — DILTIAZEM HYDROCHLORIDE 120 MG/1
120 CAPSULE, EXTENDED RELEASE ORAL DAILY
Qty: 90 CAPSULE | Refills: 3 | Status: SHIPPED | OUTPATIENT
Start: 2023-12-05

## 2023-12-05 RX ORDER — OMEPRAZOLE 40 MG/1
CAPSULE, DELAYED RELEASE ORAL
Qty: 90 CAPSULE | Refills: 3 | Status: SHIPPED | OUTPATIENT
Start: 2023-12-05

## 2023-12-05 ASSESSMENT — PATIENT HEALTH QUESTIONNAIRE - PHQ9
SUM OF ALL RESPONSES TO PHQ QUESTIONS 1-9: 0
1. LITTLE INTEREST OR PLEASURE IN DOING THINGS: 0
2. FEELING DOWN, DEPRESSED OR HOPELESS: 0
SUM OF ALL RESPONSES TO PHQ9 QUESTIONS 1 & 2: 0
SUM OF ALL RESPONSES TO PHQ QUESTIONS 1-9: 0

## 2023-12-21 LAB — HEMOCCULT STL QL IA: NEGATIVE

## 2023-12-31 ENCOUNTER — APPOINTMENT (OUTPATIENT)
Age: 38
End: 2023-12-31
Payer: MEDICAID

## 2023-12-31 ENCOUNTER — HOSPITAL ENCOUNTER (EMERGENCY)
Age: 38
Discharge: HOME | End: 2023-12-31
Attending: FAMILY MEDICINE
Payer: MEDICAID

## 2023-12-31 VITALS
SYSTOLIC BLOOD PRESSURE: 146 MMHG | DIASTOLIC BLOOD PRESSURE: 95 MMHG | TEMPERATURE: 99.3 F | RESPIRATION RATE: 17 BRPM | OXYGEN SATURATION: 99 % | HEART RATE: 79 BPM | HEIGHT: 66 IN | WEIGHT: 257.9 LBS | BODY MASS INDEX: 41.45 KG/M2

## 2023-12-31 DIAGNOSIS — R00.2 PALPITATIONS: Primary | ICD-10-CM

## 2023-12-31 DIAGNOSIS — I15.9 SECONDARY HYPERTENSION: ICD-10-CM

## 2023-12-31 DIAGNOSIS — J01.20 ACUTE ETHMOIDAL SINUSITIS, RECURRENCE NOT SPECIFIED: ICD-10-CM

## 2023-12-31 LAB
ALBUMIN SERPL-MCNC: 3.4 G/DL (ref 3.4–5)
ALBUMIN/GLOB SERPL: 0.8 (ref 0.8–1.7)
ALP SERPL-CCNC: 67 U/L (ref 45–117)
ALT SERPL-CCNC: 17 U/L (ref 13–56)
AMPHET UR QL SCN: NEGATIVE
ANION GAP SERPL CALC-SCNC: 10 MMOL/L (ref 3–18)
AST SERPL W P-5'-P-CCNC: 13 U/L (ref 10–38)
BARBITURATES UR QL SCN: NEGATIVE
BASOPHILS # BLD: 0 K/UL (ref 0–0.1)
BASOPHILS NFR BLD: 1 % (ref 0–2)
BENZODIAZ UR QL: NEGATIVE
BILIRUB SERPL-MCNC: 0.1 MG/DL (ref 0.2–1)
BUN SERPL-MCNC: 11 MG/DL (ref 7–18)
BUN/CREAT SERPL: 12 (ref 12–20)
CA-I BLD-MCNC: 8.8 MG/DL (ref 8.5–10.1)
CANNABINOIDS UR QL SCN: NEGATIVE
CHLORIDE SERPL-SCNC: 105 MMOL/L (ref 100–111)
CO2 SERPL-SCNC: 25 MMOL/L (ref 21–32)
COCAINE UR QL SCN: NEGATIVE
CREAT SERPL-MCNC: 0.94 MG/DL (ref 0.6–1.3)
DIFFERENTIAL METHOD BLD: ABNORMAL
EOSINOPHIL # BLD: 0.2 K/UL (ref 0–0.4)
EOSINOPHIL NFR BLD: 3 % (ref 0–5)
ERYTHROCYTE [DISTWIDTH] IN BLOOD BY AUTOMATED COUNT: 15 % (ref 11.6–14.5)
ETHANOL SERPL-MCNC: 4 MG/DL (ref 0–3)
FENTANYL UR QL SCN: NEGATIVE
GLOBULIN SER CALC-MCNC: 4.3 G/DL (ref 2–4)
GLUCOSE SERPL-MCNC: 223 MG/DL (ref 74–99)
HCG UR QL: NEGATIVE
HCT VFR BLD AUTO: 34.3 % (ref 35–45)
HGB BLD-MCNC: 11.5 G/DL (ref 12–16)
IMM GRANULOCYTES # BLD AUTO: 0 K/UL (ref 0–0.04)
IMM GRANULOCYTES NFR BLD AUTO: 0 % (ref 0–0.5)
LYMPHOCYTES # BLD: 2.2 K/UL (ref 0.9–3.6)
LYMPHOCYTES NFR BLD: 33 % (ref 21–52)
Lab: NORMAL
MAGNESIUM SERPL-MCNC: 2.1 MG/DL (ref 1.6–2.6)
MCH RBC QN AUTO: 28.6 PG (ref 24–34)
MCHC RBC AUTO-ENTMCNC: 33.5 G/DL (ref 31–37)
MCV RBC AUTO: 85.3 FL (ref 78–100)
METHADONE UR QL: NEGATIVE
MONOCYTES # BLD: 0.4 K/UL (ref 0.05–1.2)
MONOCYTES NFR BLD: 6 % (ref 3–10)
NEUTS SEG # BLD: 3.8 K/UL (ref 1.8–8)
NEUTS SEG NFR BLD: 57 % (ref 40–73)
NRBC # BLD: 0 K/UL (ref 0–0.01)
NRBC BLD-RTO: 0 PER 100 WBC
OPIATES UR QL: NEGATIVE
OXYCODONE UR QL SCN: NEGATIVE
PCP UR QL: NEGATIVE
PLATELET # BLD AUTO: 353 K/UL (ref 135–420)
PMV BLD AUTO: 10.4 FL (ref 9.2–11.8)
POTASSIUM SERPL-SCNC: 3.8 MMOL/L (ref 3.5–5.5)
PROPOXYPH UR QL: NEGATIVE
PROT SERPL-MCNC: 7.7 G/DL (ref 6.4–8.2)
RBC # BLD AUTO: 4.02 M/UL (ref 4.2–5.3)
SODIUM SERPL-SCNC: 140 MMOL/L (ref 136–145)
TRICYCLICS UR QL: NEGATIVE
TROPONIN I SERPL HS-MCNC: 5 NG/L (ref 0–54)
TSH SERPL DL<=0.05 MIU/L-ACNC: 2.41 UIU/ML (ref 0.36–3.74)
WBC # BLD AUTO: 6.7 K/UL (ref 4.6–13.2)

## 2023-12-31 PROCEDURE — 2500000003 HC RX 250 WO HCPCS: Performed by: FAMILY MEDICINE

## 2023-12-31 PROCEDURE — 71045 X-RAY EXAM CHEST 1 VIEW: CPT

## 2023-12-31 PROCEDURE — 6370000000 HC RX 637 (ALT 250 FOR IP): Performed by: FAMILY MEDICINE

## 2023-12-31 PROCEDURE — 96374 THER/PROPH/DIAG INJ IV PUSH: CPT

## 2023-12-31 PROCEDURE — 84443 ASSAY THYROID STIM HORMONE: CPT

## 2023-12-31 PROCEDURE — 81025 URINE PREGNANCY TEST: CPT

## 2023-12-31 PROCEDURE — 84484 ASSAY OF TROPONIN QUANT: CPT

## 2023-12-31 PROCEDURE — 82077 ASSAY SPEC XCP UR&BREATH IA: CPT

## 2023-12-31 PROCEDURE — 80307 DRUG TEST PRSMV CHEM ANLYZR: CPT

## 2023-12-31 PROCEDURE — 85025 COMPLETE CBC W/AUTO DIFF WBC: CPT

## 2023-12-31 PROCEDURE — 80053 COMPREHEN METABOLIC PANEL: CPT

## 2023-12-31 PROCEDURE — 96375 TX/PRO/DX INJ NEW DRUG ADDON: CPT

## 2023-12-31 PROCEDURE — 6360000002 HC RX W HCPCS: Performed by: FAMILY MEDICINE

## 2023-12-31 PROCEDURE — 81001 URINALYSIS AUTO W/SCOPE: CPT

## 2023-12-31 PROCEDURE — 83735 ASSAY OF MAGNESIUM: CPT

## 2023-12-31 PROCEDURE — 99285 EMERGENCY DEPT VISIT HI MDM: CPT

## 2023-12-31 PROCEDURE — 93005 ELECTROCARDIOGRAM TRACING: CPT | Performed by: FAMILY MEDICINE

## 2023-12-31 PROCEDURE — 36415 COLL VENOUS BLD VENIPUNCTURE: CPT

## 2023-12-31 RX ORDER — KETOROLAC TROMETHAMINE 30 MG/ML
30 INJECTION, SOLUTION INTRAMUSCULAR; INTRAVENOUS ONCE
Status: COMPLETED | OUTPATIENT
Start: 2023-12-31 | End: 2023-12-31

## 2023-12-31 RX ORDER — FLUCONAZOLE 150 MG/1
150 TABLET ORAL ONCE
Qty: 1 TABLET | Refills: 0 | Status: SHIPPED | OUTPATIENT
Start: 2023-12-31 | End: 2023-12-31

## 2023-12-31 RX ORDER — METOPROLOL TARTRATE 1 MG/ML
5 INJECTION, SOLUTION INTRAVENOUS
Status: COMPLETED | OUTPATIENT
Start: 2023-12-31 | End: 2023-12-31

## 2023-12-31 RX ORDER — AMOXICILLIN AND CLAVULANATE POTASSIUM 875; 125 MG/1; MG/1
1 TABLET, FILM COATED ORAL 2 TIMES DAILY
Qty: 14 TABLET | Refills: 0 | Status: SHIPPED | OUTPATIENT
Start: 2023-12-31 | End: 2024-01-07

## 2023-12-31 RX ORDER — AMOXICILLIN AND CLAVULANATE POTASSIUM 875; 125 MG/1; MG/1
1 TABLET, FILM COATED ORAL
Status: COMPLETED | OUTPATIENT
Start: 2023-12-31 | End: 2023-12-31

## 2023-12-31 RX ORDER — CARVEDILOL 3.12 MG/1
3.12 TABLET ORAL 2 TIMES DAILY WITH MEALS
COMMUNITY

## 2023-12-31 RX ADMIN — AMOXICILLIN AND CLAVULANATE POTASSIUM 1 TABLET: 875; 125 TABLET, FILM COATED ORAL at 06:07

## 2023-12-31 RX ADMIN — KETOROLAC TROMETHAMINE 30 MG: 30 INJECTION, SOLUTION INTRAMUSCULAR; INTRAVENOUS at 06:06

## 2023-12-31 RX ADMIN — METOPROLOL TARTRATE 5 MG: 5 INJECTION INTRAVENOUS at 03:27

## 2023-12-31 NOTE — DISCHARGE INSTRUCTIONS
Keep your follow up appoint with your Primary Doctor. Take your medications as prescribed. Return to the ED for new or worsening symptoms

## 2023-12-31 NOTE — ED PROVIDER NOTES
[facility] EMERGENCY DEPT  EMERGENCY DEPARTMENT ENCOUNTER      Pt Name: Gurwinder Pretty  MRN: [id number]  Birthdate 1985  Date of evaluation: 12/31/2023  Provider: Geetha Mata,   5:55 AM    CHIEF COMPLAINT       Chief Complaint   Patient presents with    Hypertension         HISTORY OF PRESENT ILLNESS    Gurwinder Pretty is a 38 y.o. female who presents to the emergency department palpitations, elevated BP     Patient presents to the ED for palpitations, elevated BP, and headache. Patient states she was awaken from sleep by a sensation of her heart racing. She denies chest pain. She reports a headache with pounding in her ears. She states the heart racing has stopped and the pounding in her ears has stopped but the headache continues. She was also concerned because her BP was elevated. She reports a history of migraines but states this HA is different. She states the pain is behind the right eye and will radiate to her right temple and down into her teeth. She states her children has been sick with a cold. She reports nasal congestion and states the headache pain will intermittently become worse with blowing her nose. Patient has had palpitations in the past and has been evaluated by Cardiology and seen several times in the ED for similar symptoms. She states she was recently changed from Diltiazem to Carvedilol and has follow up 12 Jan for her BP medication adjustment follow up. No fever, chills, chest pain, SOB, n/v, abdominal pain, dizziness, numbness, or syncope    The history is provided by the patient, the EMS personnel and medical records.       Nursing Notes were reviewed.    REVIEW OF SYSTEMS       Review of Systems   Constitutional: Negative.    HENT:  Positive for rhinorrhea, sinus pressure and sinus pain. Negative for ear pain and sore throat.    Respiratory: Negative.     Cardiovascular:  Positive for palpitations.   Genitourinary: Negative.    Neurological:  Positive for headaches.   All other

## 2024-01-01 LAB
APPEARANCE UR: ABNORMAL
BACTERIA URNS QL MICRO: NEGATIVE /HPF
BILIRUB UR QL: NEGATIVE
COLOR UR: ABNORMAL
EPITH CASTS URNS QL MICRO: ABNORMAL /LPF (ref 0–20)
GLUCOSE UR STRIP.AUTO-MCNC: 500 MG/DL
HGB UR QL STRIP: ABNORMAL
KETONES UR QL STRIP.AUTO: NEGATIVE MG/DL
LEUKOCYTE ESTERASE UR QL STRIP.AUTO: NEGATIVE
NITRITE UR QL STRIP.AUTO: NEGATIVE
PH UR STRIP: 5.5 (ref 5–8)
PROT UR STRIP-MCNC: 30 MG/DL
RBC #/AREA URNS HPF: >100 /HPF (ref 0–2)
SP GR UR REFRACTOMETRY: 1.02 (ref 1–1.03)
UROBILINOGEN UR QL STRIP.AUTO: 0.2 EU/DL (ref 0.2–1)
WBC URNS QL MICRO: ABNORMAL /HPF (ref 0–4)

## 2024-01-03 LAB
EKG ATRIAL RATE: 72 BPM
EKG DIAGNOSIS: NORMAL
EKG P AXIS: 35 DEGREES
EKG P-R INTERVAL: 176 MS
EKG Q-T INTERVAL: 388 MS
EKG QRS DURATION: 84 MS
EKG QTC CALCULATION (BAZETT): 424 MS
EKG R AXIS: 13 DEGREES
EKG T AXIS: 21 DEGREES
EKG VENTRICULAR RATE: 72 BPM

## 2024-01-08 ENCOUNTER — OFFICE VISIT (OUTPATIENT)
Dept: FAMILY MEDICINE CLINIC | Facility: CLINIC | Age: 39
End: 2024-01-08
Payer: MEDICAID

## 2024-01-08 VITALS
BODY MASS INDEX: 40.5 KG/M2 | DIASTOLIC BLOOD PRESSURE: 99 MMHG | OXYGEN SATURATION: 98 % | SYSTOLIC BLOOD PRESSURE: 178 MMHG | HEIGHT: 66 IN | WEIGHT: 252 LBS | HEART RATE: 94 BPM | TEMPERATURE: 99 F

## 2024-01-08 DIAGNOSIS — R42 DIZZINESS: Primary | ICD-10-CM

## 2024-01-08 DIAGNOSIS — I10 PRIMARY HYPERTENSION: ICD-10-CM

## 2024-01-08 PROCEDURE — 3077F SYST BP >= 140 MM HG: CPT

## 2024-01-08 PROCEDURE — 3080F DIAST BP >= 90 MM HG: CPT

## 2024-01-08 PROCEDURE — 99214 OFFICE O/P EST MOD 30 MIN: CPT

## 2024-01-08 RX ORDER — MECLIZINE HCL 12.5 MG/1
12.5 TABLET ORAL 3 TIMES DAILY PRN
COMMUNITY

## 2024-01-08 RX ORDER — CHOLECALCIFEROL (VITAMIN D3) 25 MCG
CAPSULE ORAL
COMMUNITY
Start: 2023-12-18

## 2024-01-08 ASSESSMENT — PATIENT HEALTH QUESTIONNAIRE - PHQ9
SUM OF ALL RESPONSES TO PHQ9 QUESTIONS 1 & 2: 0
SUM OF ALL RESPONSES TO PHQ QUESTIONS 1-9: 0
10. IF YOU CHECKED OFF ANY PROBLEMS, HOW DIFFICULT HAVE THESE PROBLEMS MADE IT FOR YOU TO DO YOUR WORK, TAKE CARE OF THINGS AT HOME, OR GET ALONG WITH OTHER PEOPLE: 0
7. TROUBLE CONCENTRATING ON THINGS, SUCH AS READING THE NEWSPAPER OR WATCHING TELEVISION: 0
3. TROUBLE FALLING OR STAYING ASLEEP: 0
4. FEELING TIRED OR HAVING LITTLE ENERGY: 0
5. POOR APPETITE OR OVEREATING: 0
SUM OF ALL RESPONSES TO PHQ QUESTIONS 1-9: 0
9. THOUGHTS THAT YOU WOULD BE BETTER OFF DEAD, OR OF HURTING YOURSELF: 0
SUM OF ALL RESPONSES TO PHQ QUESTIONS 1-9: 0
8. MOVING OR SPEAKING SO SLOWLY THAT OTHER PEOPLE COULD HAVE NOTICED. OR THE OPPOSITE, BEING SO FIGETY OR RESTLESS THAT YOU HAVE BEEN MOVING AROUND A LOT MORE THAN USUAL: 0
2. FEELING DOWN, DEPRESSED OR HOPELESS: 0
SUM OF ALL RESPONSES TO PHQ QUESTIONS 1-9: 0
6. FEELING BAD ABOUT YOURSELF - OR THAT YOU ARE A FAILURE OR HAVE LET YOURSELF OR YOUR FAMILY DOWN: 0
1. LITTLE INTEREST OR PLEASURE IN DOING THINGS: 0

## 2024-01-08 NOTE — PROGRESS NOTES
Gurwinder Pretty presents today for   Chief Complaint   Patient presents with    Dizziness       Is someone accompanying this pt? No    Is the patient using any DME equipment during OV? No     Health Maintenance reviewed and discussed and ordered per Provider.    Health Maintenance Due   Topic Date Due    Hepatitis B vaccine (1 of 3 - 3-dose series) Never done    Varicella vaccine (1 of 2 - 2-dose childhood series) Never done    HIV screen  Never done    Hepatitis C screen  Never done    Cervical cancer screen  07/27/2017    COVID-19 Vaccine (4 - 2023-24 season) 09/01/2023   .      Coordination of Care:  1. \"Have you been to the ER, urgent care clinic since your last visit?  Hospitalized since your last visit?\" Yes ER for dizziness     2. \"Have you seen or consulted any other health care providers outside of the [facility] since your last visit?\" No    3. For patients aged 45-75: Has the patient had a colonoscopy? N/A based on age/sex    If the patient is female:    4. For patients aged 40-74: Has the patient had a mammogram within the past 2 years? N/A based on age/sex    5. For patients aged 21-65: Has the patient had a pap smear? Patient had one 2022 [facility]

## 2024-01-10 ASSESSMENT — ENCOUNTER SYMPTOMS
EYES NEGATIVE: 1
GASTROINTESTINAL NEGATIVE: 1
SHORTNESS OF BREATH: 0
ALLERGIC/IMMUNOLOGIC NEGATIVE: 1

## 2024-01-10 NOTE — PROGRESS NOTES
Gurwinder Pretty is a 38 y.o. female presents with   Chief Complaint   Patient presents with    Dizziness        Diagnosis   1. Dizziness    Patient presents today for dizziness since this morning. She reports she had this a few months ago and went to the ER. She reports feeling dizzy when standing.    2. Primary hypertension    Patients blood pressure today is elevated at 219/143, 201/110. She has only had one dose of clonidine and carvedilol today.      BP (!) 178/99   Pulse 94   Temp 99 °F (37.2 °C) (Temporal)   Ht 1.676 m (5' 6\")   Wt 114.3 kg (252 lb)   SpO2 98%   BMI 40.67 kg/m²   Subjective:     Past Medical History:   Diagnosis Date    Anxiety     Bipolar II disorder (HCC) 6/26/2018    Depression     Essential hypertension 1/30/2020    Fatigue 7/28/2020    Gastroesophageal reflux disease without esophagitis 1/30/2020    Goiter 6/29/2020    Hyperlipidemia 1/30/2020    Hyperthyroidism 1/3/2019    Hypothyroidism 9/5/2019    Migraine 7/28/2020    Panic attack     Persistent cough 1/30/2020    Postpartum depression 7/28/2020    Prediabetes 6/29/2020    Psychiatric disorder     depression with SI   Migraines     Sleep disorder     Suicidal thoughts      Past Surgical History:   Procedure Laterality Date    GYN      2 c-sections     Social History     Socioeconomic History    Marital status: Single     Spouse name: None    Number of children: 2    Years of education: None    Highest education level: None   Occupational History    Occupation: personal care aid   Tobacco Use    Smoking status: Never    Smokeless tobacco: Never   Vaping Use    Vaping Use: Never used   Substance and Sexual Activity    Alcohol use: No    Drug use: No    Sexual activity: Yes     Partners: Male     Social Determinants of Health     Financial Resource Strain: Medium Risk (10/17/2023)    Overall Financial Resource Strain (CARDIA)     Difficulty of Paying Living Expenses: Somewhat hard   Food Insecurity:   Recent Concern: Food

## 2024-01-17 ENCOUNTER — OFFICE VISIT (OUTPATIENT)
Facility: CLINIC | Age: 39
End: 2024-01-17
Payer: MEDICAID

## 2024-01-17 VITALS
RESPIRATION RATE: 19 BRPM | HEART RATE: 89 BPM | OXYGEN SATURATION: 99 % | HEIGHT: 66 IN | SYSTOLIC BLOOD PRESSURE: 128 MMHG | BODY MASS INDEX: 40.15 KG/M2 | DIASTOLIC BLOOD PRESSURE: 78 MMHG | WEIGHT: 249.8 LBS

## 2024-01-17 DIAGNOSIS — F20.9 SCHIZOPHRENIA, UNSPECIFIED TYPE (HCC): ICD-10-CM

## 2024-01-17 DIAGNOSIS — G47.33 OSA (OBSTRUCTIVE SLEEP APNEA): ICD-10-CM

## 2024-01-17 DIAGNOSIS — E03.9 HYPOTHYROIDISM, UNSPECIFIED TYPE: ICD-10-CM

## 2024-01-17 DIAGNOSIS — F31.70 BIPOLAR DISORDER IN FULL REMISSION, MOST RECENT EPISODE UNSPECIFIED TYPE (HCC): ICD-10-CM

## 2024-01-17 DIAGNOSIS — R73.03 PREDIABETES: ICD-10-CM

## 2024-01-17 DIAGNOSIS — I10 HYPERTENSION, UNSPECIFIED TYPE: Primary | ICD-10-CM

## 2024-01-17 DIAGNOSIS — L30.9 ECZEMA, UNSPECIFIED TYPE: ICD-10-CM

## 2024-01-17 DIAGNOSIS — E55.9 VITAMIN D DEFICIENCY: ICD-10-CM

## 2024-01-17 DIAGNOSIS — D64.9 ANEMIA, UNSPECIFIED TYPE: ICD-10-CM

## 2024-01-17 DIAGNOSIS — E78.2 HYPERLIPIDEMIA, MIXED: ICD-10-CM

## 2024-01-17 DIAGNOSIS — R42 VERTIGO: ICD-10-CM

## 2024-01-17 PROCEDURE — 3078F DIAST BP <80 MM HG: CPT | Performed by: FAMILY MEDICINE

## 2024-01-17 PROCEDURE — 3074F SYST BP LT 130 MM HG: CPT | Performed by: FAMILY MEDICINE

## 2024-01-17 PROCEDURE — 99215 OFFICE O/P EST HI 40 MIN: CPT | Performed by: FAMILY MEDICINE

## 2024-01-17 RX ORDER — CARVEDILOL 6.25 MG/1
6.25 TABLET ORAL 2 TIMES DAILY
COMMUNITY
Start: 2024-01-09

## 2024-01-17 RX ORDER — ERGOCALCIFEROL 1.25 MG/1
50000 CAPSULE ORAL WEEKLY
Qty: 12 CAPSULE | Refills: 1 | Status: SHIPPED | OUTPATIENT
Start: 2024-01-17

## 2024-01-17 RX ORDER — TRIAMCINOLONE ACETONIDE 1 MG/G
CREAM TOPICAL 2 TIMES DAILY
Qty: 15 G | Refills: 2 | Status: SHIPPED | OUTPATIENT
Start: 2024-01-17

## 2024-01-17 RX ORDER — TRIAMCINOLONE ACETONIDE 1 MG/G
CREAM TOPICAL 2 TIMES DAILY
COMMUNITY
End: 2024-01-17 | Stop reason: SDUPTHER

## 2024-01-17 RX ORDER — CHLORTHALIDONE 25 MG/1
25 TABLET ORAL DAILY
COMMUNITY
Start: 2024-01-12

## 2024-01-17 ASSESSMENT — PATIENT HEALTH QUESTIONNAIRE - PHQ9
8. MOVING OR SPEAKING SO SLOWLY THAT OTHER PEOPLE COULD HAVE NOTICED. OR THE OPPOSITE, BEING SO FIGETY OR RESTLESS THAT YOU HAVE BEEN MOVING AROUND A LOT MORE THAN USUAL: 0
1. LITTLE INTEREST OR PLEASURE IN DOING THINGS: 0
5. POOR APPETITE OR OVEREATING: 0
SUM OF ALL RESPONSES TO PHQ QUESTIONS 1-9: 0
SUM OF ALL RESPONSES TO PHQ QUESTIONS 1-9: 0
4. FEELING TIRED OR HAVING LITTLE ENERGY: 0
7. TROUBLE CONCENTRATING ON THINGS, SUCH AS READING THE NEWSPAPER OR WATCHING TELEVISION: 0
SUM OF ALL RESPONSES TO PHQ QUESTIONS 1-9: 0
2. FEELING DOWN, DEPRESSED OR HOPELESS: 0
10. IF YOU CHECKED OFF ANY PROBLEMS, HOW DIFFICULT HAVE THESE PROBLEMS MADE IT FOR YOU TO DO YOUR WORK, TAKE CARE OF THINGS AT HOME, OR GET ALONG WITH OTHER PEOPLE: 0
6. FEELING BAD ABOUT YOURSELF - OR THAT YOU ARE A FAILURE OR HAVE LET YOURSELF OR YOUR FAMILY DOWN: 0
9. THOUGHTS THAT YOU WOULD BE BETTER OFF DEAD, OR OF HURTING YOURSELF: 0
3. TROUBLE FALLING OR STAYING ASLEEP: 0
SUM OF ALL RESPONSES TO PHQ9 QUESTIONS 1 & 2: 0
SUM OF ALL RESPONSES TO PHQ QUESTIONS 1-9: 0

## 2024-01-17 NOTE — PROGRESS NOTES
Gurwinder Pretty presents today for No chief complaint on file.      Is someone accompanying this pt? no    Is the patient using any DME equipment during OV? no    Depression Screenin/17/2024     1:23 PM 2024     3:31 PM 2023     9:44 AM 2023    11:01 AM 10/17/2023     9:41 AM   PHQ-9 Questionaire   Little interest or pleasure in doing things 0 0 0 0 0   Feeling down, depressed, or hopeless 0 0 0 0 0   Trouble falling or staying asleep, or sleeping too much 0 0      Feeling tired or having little energy 0 0      Poor appetite or overeating 0 0      Feeling bad about yourself - or that you are a failure or have let yourself or your family down 0 0      Trouble concentrating on things, such as reading the newspaper or watching television 0 0      Moving or speaking so slowly that other people could have noticed. Or the opposite - being so fidgety or restless that you have been moving around a lot more than usual 0 0      Thoughts that you would be better off dead, or of hurting yourself in some way 0 0      PHQ-9 Total Score 0 0 0 0 0   If you checked off any problems, how difficult have these problems made it for you to do your work, take care of things at home, or get along with other people? 0 0          Fall Risk       No data to display                 Health Maintenance reviewed and discussed and ordered per Provider.    Health Maintenance Due   Topic Date Due    Hepatitis B vaccine (1 of 3 - 3-dose series) Never done    Varicella vaccine (1 of 2 - 2-dose childhood series) Never done    HIV screen  Never done    Hepatitis C screen  Never done    Cervical cancer screen  2017    COVID-19 Vaccine ( season) 2023   .      Coordination of Care:    1. \"Have you been to the ER, urgent care clinic since your last visit?  Hospitalized since your last visit?\" Yes notes in chart    2. \"Have you seen or consulted any other health care providers outside of the [facility] 
scheduling.    Return in about 4 weeks (around 2/14/2024) for needs follow-up chronic conditions or sooner if needed, labs 2-3 days prior to appointment.    Gurwinder Pretty agrees with plan as above and has no additional questions at this time.       SUBJECTIVE/OBJECTIVE:    Dx vertigo is 2022 in ER- lasted 1 week.  Resulted mid Dec 2023- persistent intermittent, every day- mostly in the mornings with OOB, she takes Meclizine QAM and this helps some.     Pt uses her CPAP for UVALDO- but trouble wearing all night, last visit was <1 year ago.   No big changes in habitus since fitted for her mask.      HTN:  Meds: Clonidine 0.3mg BID, coreg 6.25mg BID, chlorthalidone QD- started 1 week ago.      SVT:  Pt follows / [facility].  Pt recently completed Holter monitor and started on Diltiazem.    Plans for stress test tomorrow.    Hypothyroidism:   Meds: Levothyroxine 50mcg QAM- on an empty belly.    She is taking iron supplement every other day since Dec 2023.    She takes Vit D supplement.      Bipolar, Schizophrenia, depression, anxiety:  Mood is \"fine as long as I'm on my med\".    She follows / [facility].  Meds: Aripiprazole 10mg QHS, Buppropion 150mg QAD, Buspar 5mg QAM or BID.    Hx multiple allergies.    10/26/23 results review;  CBC WNL- except hgb 11.1  Iron, TIBC, Ferritin (33) WNL  B12 1,367  CMP WNL- Cr 0.7, k+ 4.4  Mag WNL  Lipids; , HDL 65  The ASCVD Risk score (Carmelo TINSLEY, et al., 2019) failed to calculate for the following reasons:    The 2019 ASCVD risk score is only valid for ages 40 to 79  TSH 0.75; on Levothyroxine 50mcg QD  Vit D 28; she is now taking Vit D daily started ~1 week before lab draw  Thyroid U/S: not completed- pt plans to schedule this in the near future  HA1C 6.3%      Past Medical History:   Diagnosis Date    Anxiety     Bipolar II disorder (HCC) 6/26/2018    Depression     Essential hypertension 1/30/2020    Fatigue 7/28/2020    Gastroesophageal reflux

## 2024-01-17 NOTE — PATIENT INSTRUCTIONS
Ms. Pretty,  Your blood pressure looks great today!    Please call you sleep specialist to ask about possibly getting a better face mask and/or alternatives to a CPAP.    Please be sure to get labs before next visit in ~1 month,  Dr. Faye Chaudhry

## 2024-02-02 ENCOUNTER — TELEPHONE (OUTPATIENT)
Facility: CLINIC | Age: 39
End: 2024-02-02

## 2024-02-02 NOTE — TELEPHONE ENCOUNTER
----- Message from Judith Del Angel sent at 2/2/2024  8:29 AM EST -----  Subject: Message to Provider    QUESTIONS  Information for Provider? TIME-SENSITIVE? Patient is amanda'd for 02/16 with   Dr. Chaudhry for a f/u visit and would like to know if she can get a school   physical done at that same visit. Please call [facility] to discuss and/or   reschedule. Physical needed before 03/07.  ---------------------------------------------------------------------------  --------------  CALL BACK INFO  [phone]; OK to leave message on voicemail  ---------------------------------------------------------------------------  --------------  SCRIPT ANSWERS  Relationship to Patient? Self

## 2024-02-02 NOTE — TELEPHONE ENCOUNTER
Called and left voice message that current appt is a 15 minute 2 week follow up and we can not accommodate a physical at the same time so a physical would have to be scheduled at a different time and right now we don't have anything available since provider is going out on FMLA.

## 2024-03-12 ENCOUNTER — TELEPHONE (OUTPATIENT)
Facility: CLINIC | Age: 39
End: 2024-03-12

## 2024-03-12 NOTE — TELEPHONE ENCOUNTER
----- Message from Nadine Chand sent at 3/12/2024  8:44 AM EDT -----  Subject: Appointment Request    Reason for Call: Established Patient Appointment needed: Routine Existing   Condition Follow Up    QUESTIONS    Reason for appointment request? No appointments available during search     Additional Information for Provider? patient called to reschedule her appt   she missed last month - she would like to schedule for next week or the   week after so she has time to get her labs done. please call patient with   first available  ---------------------------------------------------------------------------  --------------  CALL BACK INFO  [phone]; OK to leave message on voicemail  ---------------------------------------------------------------------------  --------------  SCRIPT ANSWERS

## 2024-04-09 ENCOUNTER — OFFICE VISIT (OUTPATIENT)
Facility: CLINIC | Age: 39
End: 2024-04-09
Payer: MEDICAID

## 2024-04-09 VITALS
HEIGHT: 66 IN | RESPIRATION RATE: 16 BRPM | TEMPERATURE: 97.5 F | DIASTOLIC BLOOD PRESSURE: 84 MMHG | WEIGHT: 254.2 LBS | HEART RATE: 89 BPM | BODY MASS INDEX: 40.85 KG/M2 | SYSTOLIC BLOOD PRESSURE: 130 MMHG | OXYGEN SATURATION: 99 %

## 2024-04-09 DIAGNOSIS — E03.8 HYPOTHYROIDISM DUE TO HASHIMOTO'S THYROIDITIS: ICD-10-CM

## 2024-04-09 DIAGNOSIS — M79.10 MUSCLE SORENESS: ICD-10-CM

## 2024-04-09 DIAGNOSIS — E06.3 HYPOTHYROIDISM DUE TO HASHIMOTO'S THYROIDITIS: ICD-10-CM

## 2024-04-09 DIAGNOSIS — F31.81 BIPOLAR 2 DISORDER (HCC): Primary | ICD-10-CM

## 2024-04-09 PROCEDURE — 3079F DIAST BP 80-89 MM HG: CPT | Performed by: NURSE PRACTITIONER

## 2024-04-09 PROCEDURE — 3075F SYST BP GE 130 - 139MM HG: CPT | Performed by: NURSE PRACTITIONER

## 2024-04-09 PROCEDURE — 99213 OFFICE O/P EST LOW 20 MIN: CPT | Performed by: NURSE PRACTITIONER

## 2024-04-09 RX ORDER — LIDOCAINE 50 MG/G
2 PATCH TOPICAL DAILY
Qty: 30 PATCH | Refills: 0 | Status: SHIPPED | OUTPATIENT
Start: 2024-04-09 | End: 2024-04-24

## 2024-04-09 RX ORDER — LEVOTHYROXINE SODIUM 0.05 MG/1
50 TABLET ORAL
Qty: 90 TABLET | Refills: 0 | Status: SHIPPED | OUTPATIENT
Start: 2024-04-09 | End: 2024-07-08

## 2024-04-09 RX ORDER — LURASIDONE HYDROCHLORIDE 20 MG/1
20 TABLET, FILM COATED ORAL
Qty: 90 TABLET | Refills: 0 | Status: SHIPPED | OUTPATIENT
Start: 2024-04-09 | End: 2024-07-08

## 2024-04-09 NOTE — PROGRESS NOTES
Gurwinder Pretty (: 1985) is a 38 y.o. female patient, here for evaluation of the following chief complaint(s):  Depression (For about a week. ) and Medication Refill       ASSESSMENT/PLAN:  Below is the assessment and plan developed based on review of pertinent history, physical exam, labs, studies, and medications.        This patient has lab work ordered from her primary care provider I recommend that she get this and I will assist with evaluation when her follow-up in 2 weeks patient states she understands and agrees    The patient was seen by Montez Cheng for bipolar 2 disorder she does not want to return to the psychiatric facility for further evaluation and management new referral will be placed we will assist with her finding new specialty management  Medication Latuda prescribed patient will start this medicine I recommend she follow-up in 2 weeks sooner if indicated for a reevaluation of how the medicine is being effective    Patient requests medication Synthroid to be refilled, noted patient again needs blood work and strongly recommend she get this blood work completed    Patient states she gets some muscle soreness on her upper back area and has used lidocaine patches in the past would like a refill        1. Bipolar 2 disorder (HCC)  -     lurasidone (LATUDA) 20 MG TABS tablet; Take 1 tablet by mouth Daily with supper, Disp-90 tablet, R-0Normal  -     External Referral To Psychiatry  2. Hypothyroidism due to Hashimoto's thyroiditis  -     levothyroxine (SYNTHROID) 50 MCG tablet; Take 1 tablet by mouth every morning (before breakfast), Disp-90 tablet, R-0Normal  3. Muscle soreness  -     lidocaine (LIDODERM) 5 %; Place 2 patches onto the skin daily for 15 days 12 hours on, 12 hours off., Disp-30 patch, R-0Normal    No results found for any visits on 24.     Return in about 2 weeks (around 2024).      I have discussed the diagnosis with the patient and the intended plan as seen in

## 2024-04-09 NOTE — PROGRESS NOTES
Chief Complaint   Patient presents with    Depression     For about a week.     Medication Refill       \"Have you been to the ER, urgent care clinic since your last visit?  Hospitalized since your last visit?\"    NO    “Have you seen or consulted any other health care providers outside of [facility] since your last visit?”    NO        “Have you had a pap smear?”    NO    No cervical cancer screening on file

## 2024-04-10 ENCOUNTER — HOSPITAL ENCOUNTER (OUTPATIENT)
Age: 39
Discharge: HOME | End: 2024-04-13

## 2024-04-10 LAB — SENTARA SPECIMEN COLLECTION: NORMAL

## 2024-04-10 PROCEDURE — 99001 SPECIMEN HANDLING PT-LAB: CPT

## 2024-04-12 LAB
ANION GAP SERPL CALCULATED.3IONS-SCNC: 14 MMOL/L (ref 3–15)
BASOPHILS # BLD: 1 % (ref 0–2)
BASOPHILS ABSOLUTE: 0 K/UL (ref 0–0.2)
BUN BLDV-MCNC: 14 MG/DL (ref 6–22)
CALCIUM SERPL-MCNC: 10 MG/DL (ref 8.4–10.5)
CHLORIDE BLD-SCNC: 98 MMOL/L (ref 98–110)
CO2: 27 MMOL/L (ref 20–32)
CREAT SERPL-MCNC: 0.8 MG/DL (ref 0.5–1.2)
EOSINOPHIL # BLD: 2 % (ref 0–6)
EOSINOPHILS ABSOLUTE: 0.1 K/UL (ref 0–0.5)
ESTIMATED AVERAGE GLUCOSE: 160 MG/DL (ref 91–123)
FERRITIN: 130 NG/ML (ref 10–291)
GLOMERULAR FILTRATION RATE: >60 ML/MIN/1.73 SQ.M.
GLUCOSE: 138 MG/DL (ref 70–99)
HBA1C MFR BLD: 7.2 % (ref 4.8–5.6)
HCT VFR BLD CALC: 36.3 % (ref 35.1–46.5)
HEMOGLOBIN: 11.7 G/DL (ref 11.7–15.5)
IRON % SATURATION: 25 % (ref 20–50)
IRON: 85 MCG/DL (ref 30–160)
LYMPHOCYTES # BLD: 31 % (ref 20–45)
LYMPHOCYTES ABSOLUTE: 1.6 K/UL (ref 1–4.8)
MAGNESIUM: 1.9 MG/DL (ref 1.6–2.5)
MCH RBC QN AUTO: 29 PG (ref 26–34)
MCHC RBC AUTO-ENTMCNC: 32 G/DL (ref 31–36)
MCV RBC AUTO: 91 FL (ref 80–99)
MONOCYTES ABSOLUTE: 0.3 K/UL (ref 0.1–1)
MONOCYTES: 6 % (ref 3–12)
NEUTROPHILS ABSOLUTE: 3 K/UL (ref 1.8–7.7)
NEUTROPHILS: 59 % (ref 40–75)
PDW BLD-RTO: 15.3 % (ref 10–15.5)
PLATELET # BLD: 345 K/UL (ref 140–440)
PMV BLD AUTO: 11.3 FL (ref 9–13)
POTASSIUM SERPL-SCNC: 4 MMOL/L (ref 3.5–5.5)
RBC: 3.98 M/UL (ref 3.8–5.2)
SODIUM BLD-SCNC: 139 MMOL/L (ref 133–145)
TOTAL IRON BINDING CAPACITY: 341 MCG/DL (ref 228–428)
TSH SERPL DL<=0.05 MIU/L-ACNC: 0.58 MCU/ML (ref 0.27–4.2)
UIBC: 256 MCG/DL (ref 110–370)
VITAMIN D 25-HYDROXY: 38.5 NG/ML (ref 32–100)
WBC: 5.1 K/UL (ref 4–11)

## 2024-04-19 ENCOUNTER — HOSPITAL ENCOUNTER (OUTPATIENT)
Age: 39
End: 2024-04-19
Attending: FAMILY MEDICINE
Payer: MEDICAID

## 2024-04-19 DIAGNOSIS — E03.9 HYPOTHYROIDISM, UNSPECIFIED TYPE: ICD-10-CM

## 2024-04-19 PROCEDURE — 76536 US EXAM OF HEAD AND NECK: CPT

## 2024-04-22 PROBLEM — E11.9 TYPE 2 DIABETES MELLITUS WITHOUT COMPLICATION, WITHOUT LONG-TERM CURRENT USE OF INSULIN (HCC): Status: ACTIVE | Noted: 2024-04-22

## 2024-04-22 NOTE — PROGRESS NOTES
Gurwinder Pretty (: 1985) is a 38 y.o. female patient, here for evaluation of the following chief complaint(s):  Follow-up (Folllow up-started new medication)       ASSESSMENT/PLAN:  Below is the assessment and plan developed based on review of pertinent history, physical exam, labs, studies, and medications.                      1. Type 2 diabetes mellitus without complication, without long-term current use of insulin (HCC)  Comments:  New diagnosis  Assessment & Plan:   Patient is aware hemoglobin A1c diagnosis of diabetes  Metformin prescribed  Discussed dietary changes such as simple sugar elimination from diet with foods and drinks.  Referred to [facility] diabetic nursing/education  1 month follow-up to discuss how patient is doing with new medication and diabetic management  Orders:  -     metFORMIN (GLUCOPHAGE) 500 MG tablet; Take 1 tablet by mouth daily (with breakfast), Disp-30 tablet, R-5Normal  2. Bipolar II disorder (HCC)  Assessment & Plan:   Stable on Latuda at this time  We will reupdate and assist patient with contacting psychiatry for evaluation  3. Essential hypertension  Assessment & Plan:   Blood pressure stable keep blood pressure log and bring blood pressure numbers on return visit in about 1 month  4. Hypothyroidism due to Hashimoto's thyroiditis  Comments:  TSH low normal we will continue to monitor  5. Screening for cholesterol level  -     Lipid Panel; Future      No results found for any visits on 24.     Return in about 1 month (around 2024).      I have discussed the diagnosis with the patient and the intended plan as seen in the above orders.  Questions were answered concerning future plans.  I have discussed medication side effects and warnings with the patient as well. I have reviewed the plan of care with the patient, accepted their input and they are in agreement with the treatment goals. Previous lab and imaging results were reviewed by me.     I 20 minutes,

## 2024-04-24 ENCOUNTER — OFFICE VISIT (OUTPATIENT)
Facility: CLINIC | Age: 39
End: 2024-04-24
Payer: MEDICAID

## 2024-04-24 VITALS
BODY MASS INDEX: 40.66 KG/M2 | OXYGEN SATURATION: 98 % | WEIGHT: 253 LBS | HEART RATE: 84 BPM | HEIGHT: 66 IN | SYSTOLIC BLOOD PRESSURE: 119 MMHG | TEMPERATURE: 97.4 F | DIASTOLIC BLOOD PRESSURE: 73 MMHG | RESPIRATION RATE: 18 BRPM

## 2024-04-24 DIAGNOSIS — I10 ESSENTIAL HYPERTENSION: ICD-10-CM

## 2024-04-24 DIAGNOSIS — Z13.220 SCREENING FOR CHOLESTEROL LEVEL: ICD-10-CM

## 2024-04-24 DIAGNOSIS — F31.81 BIPOLAR II DISORDER (HCC): ICD-10-CM

## 2024-04-24 DIAGNOSIS — E03.8 HYPOTHYROIDISM DUE TO HASHIMOTO'S THYROIDITIS: ICD-10-CM

## 2024-04-24 DIAGNOSIS — E11.9 TYPE 2 DIABETES MELLITUS WITHOUT COMPLICATION, WITHOUT LONG-TERM CURRENT USE OF INSULIN (HCC): Primary | ICD-10-CM

## 2024-04-24 DIAGNOSIS — E06.3 HYPOTHYROIDISM DUE TO HASHIMOTO'S THYROIDITIS: ICD-10-CM

## 2024-04-24 PROBLEM — R73.03 PREDIABETES: Status: RESOLVED | Noted: 2020-06-29 | Resolved: 2024-04-24

## 2024-04-24 PROCEDURE — 3051F HG A1C>EQUAL 7.0%<8.0%: CPT | Performed by: NURSE PRACTITIONER

## 2024-04-24 PROCEDURE — 3078F DIAST BP <80 MM HG: CPT | Performed by: NURSE PRACTITIONER

## 2024-04-24 PROCEDURE — 99213 OFFICE O/P EST LOW 20 MIN: CPT | Performed by: NURSE PRACTITIONER

## 2024-04-24 PROCEDURE — 3074F SYST BP LT 130 MM HG: CPT | Performed by: NURSE PRACTITIONER

## 2024-04-24 RX ORDER — BUSPIRONE HYDROCHLORIDE 5 MG/1
5 TABLET ORAL DAILY
COMMUNITY
Start: 2024-04-22

## 2024-04-24 ASSESSMENT — PATIENT HEALTH QUESTIONNAIRE - PHQ9
5. POOR APPETITE OR OVEREATING: MORE THAN HALF THE DAYS
3. TROUBLE FALLING OR STAYING ASLEEP: MORE THAN HALF THE DAYS
SUM OF ALL RESPONSES TO PHQ9 QUESTIONS 1 & 2: 4
8. MOVING OR SPEAKING SO SLOWLY THAT OTHER PEOPLE COULD HAVE NOTICED. OR THE OPPOSITE, BEING SO FIGETY OR RESTLESS THAT YOU HAVE BEEN MOVING AROUND A LOT MORE THAN USUAL: SEVERAL DAYS
SUM OF ALL RESPONSES TO PHQ QUESTIONS 1-9: 13
SUM OF ALL RESPONSES TO PHQ QUESTIONS 1-9: 13
6. FEELING BAD ABOUT YOURSELF - OR THAT YOU ARE A FAILURE OR HAVE LET YOURSELF OR YOUR FAMILY DOWN: SEVERAL DAYS
SUM OF ALL RESPONSES TO PHQ QUESTIONS 1-9: 13
4. FEELING TIRED OR HAVING LITTLE ENERGY: MORE THAN HALF THE DAYS
7. TROUBLE CONCENTRATING ON THINGS, SUCH AS READING THE NEWSPAPER OR WATCHING TELEVISION: SEVERAL DAYS
2. FEELING DOWN, DEPRESSED OR HOPELESS: MORE THAN HALF THE DAYS
1. LITTLE INTEREST OR PLEASURE IN DOING THINGS: MORE THAN HALF THE DAYS
9. THOUGHTS THAT YOU WOULD BE BETTER OFF DEAD, OR OF HURTING YOURSELF: NOT AT ALL
SUM OF ALL RESPONSES TO PHQ QUESTIONS 1-9: 13

## 2024-04-24 ASSESSMENT — ANXIETY QUESTIONNAIRES
1. FEELING NERVOUS, ANXIOUS, OR ON EDGE: MORE THAN HALF THE DAYS
5. BEING SO RESTLESS THAT IT IS HARD TO SIT STILL: SEVERAL DAYS
6. BECOMING EASILY ANNOYED OR IRRITABLE: SEVERAL DAYS
7. FEELING AFRAID AS IF SOMETHING AWFUL MIGHT HAPPEN: SEVERAL DAYS
4. TROUBLE RELAXING: SEVERAL DAYS
3. WORRYING TOO MUCH ABOUT DIFFERENT THINGS: MORE THAN HALF THE DAYS
2. NOT BEING ABLE TO STOP OR CONTROL WORRYING: MORE THAN HALF THE DAYS
IF YOU CHECKED OFF ANY PROBLEMS ON THIS QUESTIONNAIRE, HOW DIFFICULT HAVE THESE PROBLEMS MADE IT FOR YOU TO DO YOUR WORK, TAKE CARE OF THINGS AT HOME, OR GET ALONG WITH OTHER PEOPLE: SOMEWHAT DIFFICULT
GAD7 TOTAL SCORE: 10

## 2024-04-24 NOTE — ASSESSMENT & PLAN NOTE
Stable on Latuda at this time  We will reupdate and assist patient with contacting psychiatry for evaluation

## 2024-04-24 NOTE — ASSESSMENT & PLAN NOTE
Patient is aware hemoglobin A1c diagnosis of diabetes  Metformin prescribed  Discussed dietary changes such as simple sugar elimination from diet with foods and drinks.  Referred to [facility] diabetic nursing/education  1 month follow-up to discuss how patient is doing with new medication and diabetic management

## 2024-04-24 NOTE — ASSESSMENT & PLAN NOTE
Blood pressure stable keep blood pressure log and bring blood pressure numbers on return visit in about 1 month

## 2024-04-24 NOTE — PROGRESS NOTES
Chief Complaint   Patient presents with    Follow-up     Folllow up-started new medication     Patient states much improvement since starting medication  \"Have you been to the ER, urgent care clinic since your last visit?  Hospitalized since your last visit?\"    NO    “Have you seen or consulted any other health care providers outside of [facility] since your last visit?”    NO        “Have you had a pap smear?”    NO    No cervical cancer screening on file

## 2024-05-16 ENCOUNTER — HOSPITAL ENCOUNTER (EMERGENCY)
Age: 39
Discharge: PSYCHIATRIC OR SUBSTANCE USE FACILITY | End: 2024-05-17
Attending: FAMILY MEDICINE
Payer: MEDICAID

## 2024-05-16 ENCOUNTER — TELEPHONE (OUTPATIENT)
Facility: CLINIC | Age: 39
End: 2024-05-16

## 2024-05-16 DIAGNOSIS — E87.6 HYPOKALEMIA: ICD-10-CM

## 2024-05-16 DIAGNOSIS — F33.9 RECURRENT MAJOR DEPRESSIVE DISORDER, REMISSION STATUS UNSPECIFIED (HCC): ICD-10-CM

## 2024-05-16 DIAGNOSIS — G44.209 ACUTE NON INTRACTABLE TENSION-TYPE HEADACHE: ICD-10-CM

## 2024-05-16 DIAGNOSIS — F33.2 MAJOR DEPRESSIVE DISORDER, RECURRENT SEVERE WITHOUT PSYCHOTIC FEATURES (HCC): Primary | ICD-10-CM

## 2024-05-16 DIAGNOSIS — R45.851 SUICIDE IDEATION: Primary | ICD-10-CM

## 2024-05-16 LAB
ALBUMIN SERPL-MCNC: 3.7 G/DL (ref 3.4–5)
ALBUMIN/GLOB SERPL: 0.7 (ref 0.8–1.7)
ALP SERPL-CCNC: 59 U/L (ref 45–117)
ALT SERPL-CCNC: 26 U/L (ref 13–56)
AMPHET UR QL SCN: NEGATIVE
ANION GAP SERPL CALC-SCNC: 11 MMOL/L (ref 3–18)
APAP SERPL-MCNC: <2 UG/ML (ref 10–30)
AST SERPL W P-5'-P-CCNC: 11 U/L (ref 10–38)
BARBITURATES UR QL SCN: NEGATIVE
BASOPHILS # BLD: 0.1 K/UL (ref 0–0.1)
BASOPHILS NFR BLD: 1 % (ref 0–2)
BENZODIAZ UR QL: NEGATIVE
BILIRUB SERPL-MCNC: 0.2 MG/DL (ref 0.2–1)
BUN SERPL-MCNC: 16 MG/DL (ref 7–18)
BUN/CREAT SERPL: 14 (ref 12–20)
CA-I BLD-MCNC: 9.5 MG/DL (ref 8.5–10.1)
CANNABINOIDS UR QL SCN: NEGATIVE
CHLORIDE SERPL-SCNC: 102 MMOL/L (ref 100–111)
CO2 SERPL-SCNC: 27 MMOL/L (ref 21–32)
COCAINE UR QL SCN: NEGATIVE
CREAT SERPL-MCNC: 1.14 MG/DL (ref 0.6–1.3)
DATE LAST DOSE: ABNORMAL
DATE LAST DOSE: ABNORMAL
DIFFERENTIAL METHOD BLD: ABNORMAL
DOSE AMOUNT: ABNORMAL UNITS
DOSE AMOUNT: ABNORMAL UNITS
EOSINOPHIL # BLD: 0.1 K/UL (ref 0–0.4)
EOSINOPHIL NFR BLD: 1 % (ref 0–5)
ERYTHROCYTE [DISTWIDTH] IN BLOOD BY AUTOMATED COUNT: 14 % (ref 11.6–14.5)
ETHANOL SERPL-MCNC: <3 MG/DL (ref 0–3)
FENTANYL UR QL SCN: NEGATIVE
FLUAV RNA SPEC QL NAA+PROBE: NOT DETECTED
FLUBV RNA SPEC QL NAA+PROBE: NOT DETECTED
GLOBULIN SER CALC-MCNC: 5 G/DL (ref 2–4)
GLUCOSE SERPL-MCNC: 164 MG/DL (ref 74–99)
HCT VFR BLD AUTO: 36.8 % (ref 35–45)
HGB BLD-MCNC: 12.8 G/DL (ref 12–16)
IMM GRANULOCYTES # BLD AUTO: 0 K/UL (ref 0–0.04)
IMM GRANULOCYTES NFR BLD AUTO: 0 % (ref 0–0.5)
LYMPHOCYTES # BLD: 2.7 K/UL (ref 0.9–3.6)
LYMPHOCYTES NFR BLD: 32 % (ref 21–52)
Lab: NORMAL
MAGNESIUM SERPL-MCNC: 2 MG/DL (ref 1.6–2.6)
MCH RBC QN AUTO: 29.7 PG (ref 24–34)
MCHC RBC AUTO-ENTMCNC: 34.8 G/DL (ref 31–37)
MCV RBC AUTO: 85.4 FL (ref 78–100)
METHADONE UR QL: NEGATIVE
MONOCYTES # BLD: 0.5 K/UL (ref 0.05–1.2)
MONOCYTES NFR BLD: 6 % (ref 3–10)
NEUTS SEG # BLD: 5.1 K/UL (ref 1.8–8)
NEUTS SEG NFR BLD: 60 % (ref 40–73)
NRBC # BLD: 0 K/UL (ref 0–0.01)
NRBC BLD-RTO: 0 PER 100 WBC
OPIATES UR QL: NEGATIVE
OXYCODONE UR QL SCN: NEGATIVE
PCP UR QL: NEGATIVE
PLATELET # BLD AUTO: 421 K/UL (ref 135–420)
PMV BLD AUTO: 10.5 FL (ref 9.2–11.8)
POTASSIUM SERPL-SCNC: 3 MMOL/L (ref 3.5–5.5)
PROPOXYPH UR QL: NEGATIVE
PROT SERPL-MCNC: 8.7 G/DL (ref 6.4–8.2)
RBC # BLD AUTO: 4.31 M/UL (ref 4.2–5.3)
SALICYLATES SERPL-MCNC: <1.7 MG/DL (ref 2.8–20)
SARS-COV-2 RNA RESP QL NAA+PROBE: NOT DETECTED
SODIUM SERPL-SCNC: 140 MMOL/L (ref 136–145)
TRICYCLICS UR QL: NEGATIVE
WBC # BLD AUTO: 8.4 K/UL (ref 4.6–13.2)

## 2024-05-16 PROCEDURE — 82077 ASSAY SPEC XCP UR&BREATH IA: CPT

## 2024-05-16 PROCEDURE — 80143 DRUG ASSAY ACETAMINOPHEN: CPT

## 2024-05-16 PROCEDURE — 99285 EMERGENCY DEPT VISIT HI MDM: CPT

## 2024-05-16 PROCEDURE — 85025 COMPLETE CBC W/AUTO DIFF WBC: CPT

## 2024-05-16 PROCEDURE — 80179 DRUG ASSAY SALICYLATE: CPT

## 2024-05-16 PROCEDURE — 93005 ELECTROCARDIOGRAM TRACING: CPT | Performed by: FAMILY MEDICINE

## 2024-05-16 PROCEDURE — 6370000000 HC RX 637 (ALT 250 FOR IP): Performed by: FAMILY MEDICINE

## 2024-05-16 PROCEDURE — 80053 COMPREHEN METABOLIC PANEL: CPT

## 2024-05-16 PROCEDURE — 87636 SARSCOV2 & INF A&B AMP PRB: CPT

## 2024-05-16 PROCEDURE — 80307 DRUG TEST PRSMV CHEM ANLYZR: CPT

## 2024-05-16 PROCEDURE — 83735 ASSAY OF MAGNESIUM: CPT

## 2024-05-16 RX ORDER — CARVEDILOL 12.5 MG/1
6.25 TABLET ORAL 2 TIMES DAILY WITH MEALS
Status: DISCONTINUED | OUTPATIENT
Start: 2024-05-16 | End: 2024-05-17 | Stop reason: HOSPADM

## 2024-05-16 RX ORDER — IBUPROFEN 400 MG/1
400 TABLET ORAL ONCE
Status: COMPLETED | OUTPATIENT
Start: 2024-05-16 | End: 2024-05-16

## 2024-05-16 RX ORDER — POTASSIUM CHLORIDE 750 MG/1
40 TABLET, EXTENDED RELEASE ORAL ONCE
Status: COMPLETED | OUTPATIENT
Start: 2024-05-16 | End: 2024-05-16

## 2024-05-16 RX ORDER — CLONIDINE HYDROCHLORIDE 0.1 MG/1
0.3 TABLET ORAL 2 TIMES DAILY
Status: DISCONTINUED | OUTPATIENT
Start: 2024-05-16 | End: 2024-05-17 | Stop reason: HOSPADM

## 2024-05-16 RX ORDER — POTASSIUM CHLORIDE 750 MG/1
20 TABLET, EXTENDED RELEASE ORAL 2 TIMES DAILY
Status: DISCONTINUED | OUTPATIENT
Start: 2024-05-16 | End: 2024-05-17 | Stop reason: HOSPADM

## 2024-05-16 RX ORDER — LEVOTHYROXINE SODIUM 0.05 MG/1
50 TABLET ORAL DAILY
Status: DISCONTINUED | OUTPATIENT
Start: 2024-05-17 | End: 2024-05-17 | Stop reason: HOSPADM

## 2024-05-16 RX ORDER — CARVEDILOL 12.5 MG/1
6.25 TABLET ORAL 2 TIMES DAILY WITH MEALS
Status: DISCONTINUED | OUTPATIENT
Start: 2024-05-16 | End: 2024-05-16

## 2024-05-16 RX ORDER — LURASIDONE HYDROCHLORIDE 40 MG/1
40 TABLET, FILM COATED ORAL
Qty: 90 TABLET | Refills: 0 | Status: SHIPPED | OUTPATIENT
Start: 2024-05-16 | End: 2024-08-14

## 2024-05-16 RX ADMIN — POTASSIUM CHLORIDE 40 MEQ: 750 TABLET, EXTENDED RELEASE ORAL at 16:56

## 2024-05-16 RX ADMIN — IBUPROFEN 400 MG: 400 TABLET, FILM COATED ORAL at 16:58

## 2024-05-16 RX ADMIN — CARVEDILOL 6.25 MG: 12.5 TABLET, FILM COATED ORAL at 19:45

## 2024-05-16 RX ADMIN — CLONIDINE HYDROCHLORIDE 0.3 MG: 0.1 TABLET ORAL at 19:45

## 2024-05-16 RX ADMIN — POTASSIUM CHLORIDE 20 MEQ: 750 TABLET, EXTENDED RELEASE ORAL at 19:45

## 2024-05-16 ASSESSMENT — PAIN - FUNCTIONAL ASSESSMENT
PAIN_FUNCTIONAL_ASSESSMENT: 0-10
PAIN_FUNCTIONAL_ASSESSMENT: 0-10

## 2024-05-16 ASSESSMENT — PAIN SCALES - GENERAL
PAINLEVEL_OUTOF10: 0
PAINLEVEL_OUTOF10: 0
PAINLEVEL_OUTOF10: 8

## 2024-05-16 NOTE — ED NOTES
Faxed pt chart to the following places for pt placement:   [facility], [facility], [facility], [facility], [facility], and [facility]

## 2024-05-16 NOTE — ED NOTES
Patient resting quietly. Eyes closed, respirations even and unlabored. Constant observer present.

## 2024-05-16 NOTE — TELEPHONE ENCOUNTER
Called patient and mother back and notified of the increase, states that they made the psych appointment this morning for next month as well.

## 2024-05-16 NOTE — ED NOTES
Based upon the patient's lab work, I have started her Synthroid, as well as her Coreg.  I have held her metformin at this time as her glucose is not overly elevated.  As well as her chlorthalidone.  I have replaced her potassium orally here plus gave her some additional for the next 2 days.  Patient states that she takes her Latuda 20 mg which her primary care provider up to 40 today they have not picked it up.  At nighttime.  She also takes her clonidine twice a day next dose at nighttime, and takes her Coreg at nighttime as well.  I have written for this.  Written for the patient to take Latuda.  However it is not on her formulary.  I have written for a nursing communication I have also to take 2 tablets over 20 mg     Juventino Lancaster, DO  05/16/24 [id number]

## 2024-05-16 NOTE — TELEPHONE ENCOUNTER
Per the LPNZonia the patient's mother called stating concern that patient is having some mild issues and would like to know if Latuda could be increased.  Patient was referred to psychiatric management very important that she get this follow-up

## 2024-05-16 NOTE — ED TRIAGE NOTES
Mother states pt has been depressed for about a month, mother states about a month ago they stopped her abilify and started her on lurasidone   pt states she thinks the med is not strong enough for her    mother states pt has been like this before and had to be hospitalized to get back on track with her medications   mother states pt had a really bad outburst this am and she feels she needs to be hospitalized again to get straight   pt denies any homicidal or suicidal ideations     mother states they called PCP today to let her know what was going on, PCP increased dose of new med, but mother states they did not go get the med because she needed to be seen in ED.

## 2024-05-16 NOTE — ED PROVIDER NOTES
[facility] EMERGENCY DEPT  EMERGENCY DEPARTMENT ENCOUNTER      Pt Name: Gurwinder Pretty  MRN: [id number]  Birthdate 1985  Date of evaluation: 5/16/2024  Provider: Juventino Lancaster DO  5:40 PM    CHIEF COMPLAINT       Chief Complaint   Patient presents with    Mental Health Problem         HISTORY OF PRESENT ILLNESS    Gurwinder Pretty is a 38 y.o. female who presents to the emergency department patient comes in stating that she has a history of major depression, schizophrenia as well as bipolar.  She states that she has been depressed for quite some time but today she was unable to handle it.  She does take all of her medicines as prescribed.  She states initially when she came in she was having a fast heart rate.  Has a history of anxiety.  Denies any arguments with anybody today.  She tells me she is not having any plans to hurt herself or others at this point.  Her only complaint is she is having a headache she rates it as a 7 or an 8 out of 10 on the lateral aspects of both sides of her head.  She has not taken anything for this.  Does have an extensive allergy list.  She is not hungry nor she thirsty.  Also states she has a history of SVT    HPI    Nursing Notes were reviewed.    REVIEW OF SYSTEMS       Review of Systems   Neurological:  Positive for headaches.   Psychiatric/Behavioral:  Positive for suicidal ideas.    All other systems reviewed and are negative.      Except as noted above the remainder of the review of systems was reviewed and negative.       PAST MEDICAL HISTORY     Past Medical History:   Diagnosis Date    Anxiety     Bipolar II disorder (HCC) 6/26/2018    Depression     Essential hypertension 1/30/2020    Fatigue 7/28/2020    Gastroesophageal reflux disease without esophagitis 1/30/2020    Goiter 6/29/2020    Hyperlipidemia 1/30/2020    Hyperthyroidism 1/3/2019    Hypothyroidism 9/5/2019    Migraine 7/28/2020    Panic attack     Persistent cough 1/30/2020    Postpartum depression 7/28/2020

## 2024-05-16 NOTE — ED NOTES
[facility] called with acceptance of patient to their facility. Accepting physician is Dr. Luis Kaur and number to call report is [phone].

## 2024-05-16 NOTE — ED NOTES
[facility] ambulance service contacted for transport to [facility]. [facility] doesn't have an ambulance available until tomorrow. [facility] contacted and have a 2230 ETA tonight.
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
## 2024-05-16 NOTE — ED NOTES
In to update pt and family as to plan of care and timeline associated. Pt crying but consolable. Medicated as per orders.

## 2024-05-17 VITALS
HEIGHT: 66 IN | DIASTOLIC BLOOD PRESSURE: 81 MMHG | OXYGEN SATURATION: 100 % | HEART RATE: 83 BPM | TEMPERATURE: 98.3 F | SYSTOLIC BLOOD PRESSURE: 133 MMHG | WEIGHT: 254 LBS | RESPIRATION RATE: 18 BRPM | BODY MASS INDEX: 40.82 KG/M2

## 2024-05-17 LAB
EKG ATRIAL RATE: 93 BPM
EKG DIAGNOSIS: NORMAL
EKG P AXIS: 39 DEGREES
EKG P-R INTERVAL: 174 MS
EKG Q-T INTERVAL: 336 MS
EKG QRS DURATION: 86 MS
EKG QTC CALCULATION (BAZETT): 417 MS
EKG R AXIS: 15 DEGREES
EKG T AXIS: 32 DEGREES
EKG VENTRICULAR RATE: 93 BPM

## 2024-05-17 ASSESSMENT — PAIN SCALES - GENERAL: PAINLEVEL_OUTOF10: 0

## 2024-05-17 ASSESSMENT — PAIN - FUNCTIONAL ASSESSMENT: PAIN_FUNCTIONAL_ASSESSMENT: 0-10

## 2024-05-17 NOTE — ED NOTES
TRANSFER - OUT REPORT:    Verbal report given to Carmen Sanchez RN on Gurwinder Pretty  being transferred to [facility] for routine progression of patient care       Report consisted of patient's Situation, Background, Assessment and   Recommendations(SBAR).     Information from the following report(s) ED Encounter Summary, ED SBAR, and MAR was reviewed with the receiving nurse.    [place] Fall Assessment:    Presents to emergency department  because of falls (Syncope, seizure, or loss of consciousness): No  Age > 70: No  Altered Mental Status, Intoxication with alcohol or substance confusion (Disorientation, impaired judgment, poor safety awaremess, or inability to follow instructions): No  Impaired Mobility: Ambulates or transfers with assistive devices or assistance; Unable to ambulate or transer.: No  Nursing Judgement: No          Lines:       Opportunity for questions and clarification was provided.      Patient transported with:  [facility] ambulance service

## 2024-05-17 NOTE — ED NOTES
[facility] [employer] called and received a trip number of [id number]. [place] called to give them patient authorization number from insurance company. [facility] states they can't get the patient until they verify the authorization number at 8 am.

## 2024-05-17 NOTE — ED NOTES
[facility] called to check on ambulance ETA of 2230 and they state they don't have her down to be transferred. Will call [facility].

## 2024-05-24 PROBLEM — Z13.220 SCREENING FOR CHOLESTEROL LEVEL: Status: RESOLVED | Noted: 2024-04-24 | Resolved: 2024-05-24

## 2024-05-28 ENCOUNTER — OFFICE VISIT (OUTPATIENT)
Facility: CLINIC | Age: 39
End: 2024-05-28
Payer: MEDICAID

## 2024-05-28 VITALS
BODY MASS INDEX: 40.5 KG/M2 | HEIGHT: 66 IN | DIASTOLIC BLOOD PRESSURE: 65 MMHG | HEART RATE: 74 BPM | RESPIRATION RATE: 18 BRPM | OXYGEN SATURATION: 98 % | SYSTOLIC BLOOD PRESSURE: 106 MMHG | WEIGHT: 252 LBS | TEMPERATURE: 97.5 F

## 2024-05-28 DIAGNOSIS — E11.9 TYPE 2 DIABETES MELLITUS WITHOUT COMPLICATION, WITHOUT LONG-TERM CURRENT USE OF INSULIN (HCC): Primary | ICD-10-CM

## 2024-05-28 DIAGNOSIS — I10 ESSENTIAL HYPERTENSION: ICD-10-CM

## 2024-05-28 DIAGNOSIS — F31.81 BIPOLAR II DISORDER (HCC): ICD-10-CM

## 2024-05-28 PROCEDURE — 99213 OFFICE O/P EST LOW 20 MIN: CPT | Performed by: NURSE PRACTITIONER

## 2024-05-28 PROCEDURE — 3078F DIAST BP <80 MM HG: CPT | Performed by: NURSE PRACTITIONER

## 2024-05-28 PROCEDURE — 3074F SYST BP LT 130 MM HG: CPT | Performed by: NURSE PRACTITIONER

## 2024-05-28 PROCEDURE — 3051F HG A1C>EQUAL 7.0%<8.0%: CPT | Performed by: NURSE PRACTITIONER

## 2024-05-28 RX ORDER — METFORMIN HYDROCHLORIDE 500 MG/1
1000 TABLET, EXTENDED RELEASE ORAL
Qty: 180 TABLET | Refills: 0 | Status: SHIPPED | OUTPATIENT
Start: 2024-05-28 | End: 2024-08-26

## 2024-05-28 RX ORDER — ARIPIPRAZOLE 10 MG/1
10 TABLET ORAL NIGHTLY
COMMUNITY
Start: 2024-05-21

## 2024-05-28 ASSESSMENT — PATIENT HEALTH QUESTIONNAIRE - PHQ9
9. THOUGHTS THAT YOU WOULD BE BETTER OFF DEAD, OR OF HURTING YOURSELF: MORE THAN HALF THE DAYS
SUM OF ALL RESPONSES TO PHQ QUESTIONS 1-9: 14
8. MOVING OR SPEAKING SO SLOWLY THAT OTHER PEOPLE COULD HAVE NOTICED. OR THE OPPOSITE, BEING SO FIGETY OR RESTLESS THAT YOU HAVE BEEN MOVING AROUND A LOT MORE THAN USUAL: MORE THAN HALF THE DAYS
5. POOR APPETITE OR OVEREATING: MORE THAN HALF THE DAYS
10. IF YOU CHECKED OFF ANY PROBLEMS, HOW DIFFICULT HAVE THESE PROBLEMS MADE IT FOR YOU TO DO YOUR WORK, TAKE CARE OF THINGS AT HOME, OR GET ALONG WITH OTHER PEOPLE: VERY DIFFICULT
4. FEELING TIRED OR HAVING LITTLE ENERGY: MORE THAN HALF THE DAYS
2. FEELING DOWN, DEPRESSED OR HOPELESS: MORE THAN HALF THE DAYS
SUM OF ALL RESPONSES TO PHQ QUESTIONS 1-9: 16
3. TROUBLE FALLING OR STAYING ASLEEP: MORE THAN HALF THE DAYS
SUM OF ALL RESPONSES TO PHQ QUESTIONS 1-9: 16
SUM OF ALL RESPONSES TO PHQ QUESTIONS 1-9: 16
7. TROUBLE CONCENTRATING ON THINGS, SUCH AS READING THE NEWSPAPER OR WATCHING TELEVISION: MORE THAN HALF THE DAYS
6. FEELING BAD ABOUT YOURSELF - OR THAT YOU ARE A FAILURE OR HAVE LET YOURSELF OR YOUR FAMILY DOWN: MORE THAN HALF THE DAYS

## 2024-05-28 ASSESSMENT — ANXIETY QUESTIONNAIRES
3. WORRYING TOO MUCH ABOUT DIFFERENT THINGS: MORE THAN HALF THE DAYS
GAD7 TOTAL SCORE: 14
5. BEING SO RESTLESS THAT IT IS HARD TO SIT STILL: MORE THAN HALF THE DAYS
1. FEELING NERVOUS, ANXIOUS, OR ON EDGE: MORE THAN HALF THE DAYS
6. BECOMING EASILY ANNOYED OR IRRITABLE: MORE THAN HALF THE DAYS
7. FEELING AFRAID AS IF SOMETHING AWFUL MIGHT HAPPEN: MORE THAN HALF THE DAYS
IF YOU CHECKED OFF ANY PROBLEMS ON THIS QUESTIONNAIRE, HOW DIFFICULT HAVE THESE PROBLEMS MADE IT FOR YOU TO DO YOUR WORK, TAKE CARE OF THINGS AT HOME, OR GET ALONG WITH OTHER PEOPLE: VERY DIFFICULT
2. NOT BEING ABLE TO STOP OR CONTROL WORRYING: MORE THAN HALF THE DAYS
4. TROUBLE RELAXING: MORE THAN HALF THE DAYS

## 2024-05-28 ASSESSMENT — COLUMBIA-SUICIDE SEVERITY RATING SCALE - C-SSRS
3. HAVE YOU BEEN THINKING ABOUT HOW YOU MIGHT KILL YOURSELF?: YES
5. HAVE YOU STARTED TO WORK OUT OR WORKED OUT THE DETAILS OF HOW TO KILL YOURSELF? DO YOU INTEND TO CARRY OUT THIS PLAN?: NO
4. HAVE YOU HAD THESE THOUGHTS AND HAD SOME INTENTION OF ACTING ON THEM?: NO
2. HAVE YOU ACTUALLY HAD ANY THOUGHTS OF KILLING YOURSELF?: YES
6. HAVE YOU EVER DONE ANYTHING, STARTED TO DO ANYTHING, OR PREPARED TO DO ANYTHING TO END YOUR LIFE?: NO

## 2024-05-28 NOTE — PROGRESS NOTES
Chief Complaint   Patient presents with    Follow-up     Depression       \"Have you been to the ER, urgent care clinic since your last visit?  Hospitalized since your last visit?\"    ER visit on 5/16/24 suicidal ideations, stopped Latuda, started Abilify 10mg. Admitted to [facility] for 6 days. States labs done there showed low Potassium and high Cholesterol levels.    “Have you seen or consulted any other health care providers outside of [facility] since your last visit?”    NO        “Have you had a pap smear?”    NO    No cervical cancer screening on file          
fields without wheezing, rales, or rhonchi.  Neurological:    There is no obvious focal sensory or motor deficits.          This chart was prepared using voice recognition software and may contain unintended word substitution errors    An electronic signature was used to authenticate this note.  -- Cathie Eduardo, CJP

## 2024-05-28 NOTE — ASSESSMENT & PLAN NOTE
Metformin extended release 1000 mg daily  Follow-up in approximately 2 months for repeat hemoglobin A1c with primary care provider sooner if indicated

## 2024-05-28 NOTE — ASSESSMENT & PLAN NOTE
Patient states she has good support and feels well today taking medications as prescribed  Keep appointment with psychiatry as directed

## 2024-06-06 ENCOUNTER — TELEPHONE (OUTPATIENT)
Facility: CLINIC | Age: 39
End: 2024-06-06

## 2024-06-06 NOTE — TELEPHONE ENCOUNTER
----- Message from VENICE Nj sent at 5/31/2024  7:21 AM EDT -----  We had this patient's sign a release of records for a psychiatric admission for which she stated she had lab work done.  Will you assist by looking into this did we fax a request to the psych center to get this lab work?  Please let me know thank you so much

## 2024-06-06 NOTE — TELEPHONE ENCOUNTER
[facility] at [facility] state no labs done during her stay there. Some labs done on 4/11/24 prior to admission. Copied and scanned to chart.

## 2024-06-10 ENCOUNTER — TELEPHONE (OUTPATIENT)
Facility: CLINIC | Age: 39
End: 2024-06-10

## 2024-06-10 DIAGNOSIS — E11.9 TYPE 2 DIABETES MELLITUS WITHOUT COMPLICATION, WITHOUT LONG-TERM CURRENT USE OF INSULIN (HCC): ICD-10-CM

## 2024-06-10 DIAGNOSIS — E03.8 HYPOTHYROIDISM DUE TO HASHIMOTO'S THYROIDITIS: ICD-10-CM

## 2024-06-10 DIAGNOSIS — I10 ESSENTIAL HYPERTENSION: ICD-10-CM

## 2024-06-10 DIAGNOSIS — I10 ESSENTIAL HYPERTENSION: Primary | ICD-10-CM

## 2024-06-10 DIAGNOSIS — E06.3 HYPOTHYROIDISM DUE TO HASHIMOTO'S THYROIDITIS: ICD-10-CM

## 2024-06-10 DIAGNOSIS — E78.2 MIXED HYPERLIPIDEMIA: ICD-10-CM

## 2024-06-10 NOTE — TELEPHONE ENCOUNTER
I do have a little bit that was reviewed from April 11, 2024.  Labs are stable with an A1c of 7.2 patient on medication iron studies stable.  Patient has an appointment on July 15 with primary care provider Faye Butler encourage patient to keep appointment

## 2024-06-21 ENCOUNTER — TELEPHONE (OUTPATIENT)
Facility: CLINIC | Age: 39
End: 2024-06-21

## 2024-06-21 NOTE — TELEPHONE ENCOUNTER
Patient returned call to office. Notified of lab results reviewed by provider that were done in April. Advised no labs done at [facility]. She was advised of labs ordered to have completed.

## 2024-07-15 DIAGNOSIS — E03.8 HYPOTHYROIDISM DUE TO HASHIMOTO'S THYROIDITIS: ICD-10-CM

## 2024-07-15 DIAGNOSIS — E06.3 HYPOTHYROIDISM DUE TO HASHIMOTO'S THYROIDITIS: ICD-10-CM

## 2024-07-15 RX ORDER — LEVOTHYROXINE SODIUM 0.05 MG/1
50 TABLET ORAL
Qty: 30 TABLET | Refills: 0 | Status: SHIPPED | OUTPATIENT
Start: 2024-07-15 | End: 2024-08-14

## 2024-07-15 NOTE — TELEPHONE ENCOUNTER
Pt called to request refill. Last refilled under Mrs. Eduardo. Next appt is 7/17/24.   She took her last one this morning.

## 2024-07-24 ENCOUNTER — HOSPITAL ENCOUNTER (EMERGENCY)
Age: 39
Discharge: LEFT BEFORE DISCHARGE | End: 2024-07-24
Attending: EMERGENCY MEDICINE

## 2024-07-25 ENCOUNTER — COMMUNITY OUTREACH (OUTPATIENT)
Facility: CLINIC | Age: 39
End: 2024-07-25

## 2024-08-13 DIAGNOSIS — E06.3 HYPOTHYROIDISM DUE TO HASHIMOTO'S THYROIDITIS: ICD-10-CM

## 2024-08-13 DIAGNOSIS — E03.8 HYPOTHYROIDISM DUE TO HASHIMOTO'S THYROIDITIS: ICD-10-CM

## 2024-08-13 RX ORDER — LEVOTHYROXINE SODIUM 0.05 MG/1
TABLET ORAL
Qty: 30 TABLET | Refills: 0 | Status: SHIPPED | OUTPATIENT
Start: 2024-08-13

## 2024-08-13 NOTE — TELEPHONE ENCOUNTER
.
Called patient and reminded to have ordered labs done and reminded of appointment on 9-3-24 at 9:15 AM.
OK to refill? Did not see Dr. Chaudhry in July, looks like you are her PCP now. Labs ordered on 6-11-24 not done. Has appointment on 9-3-24.
This patient changed to MAYA Eduardo
Male

## 2024-09-25 ENCOUNTER — APPOINTMENT (OUTPATIENT)
Age: 39
End: 2024-09-25
Payer: MEDICAID

## 2024-09-25 ENCOUNTER — HOSPITAL ENCOUNTER (EMERGENCY)
Age: 39
Discharge: HOME | End: 2024-09-25
Attending: EMERGENCY MEDICINE
Payer: MEDICAID

## 2024-09-25 VITALS
OXYGEN SATURATION: 100 % | BODY MASS INDEX: 40.18 KG/M2 | TEMPERATURE: 98.7 F | SYSTOLIC BLOOD PRESSURE: 145 MMHG | WEIGHT: 250 LBS | HEART RATE: 84 BPM | HEIGHT: 66 IN | DIASTOLIC BLOOD PRESSURE: 89 MMHG | RESPIRATION RATE: 20 BRPM

## 2024-09-25 DIAGNOSIS — D25.9 UTERINE LEIOMYOMA, UNSPECIFIED LOCATION: ICD-10-CM

## 2024-09-25 DIAGNOSIS — R10.9 FLANK PAIN: ICD-10-CM

## 2024-09-25 DIAGNOSIS — S39.012A BACK STRAIN, INITIAL ENCOUNTER: Primary | ICD-10-CM

## 2024-09-25 LAB
ALBUMIN SERPL-MCNC: 3.3 G/DL (ref 3.4–5)
ALBUMIN/GLOB SERPL: 0.7 (ref 0.8–1.7)
ALP SERPL-CCNC: 55 U/L (ref 45–117)
ALT SERPL-CCNC: 23 U/L (ref 13–56)
ANION GAP SERPL CALC-SCNC: 10 MMOL/L (ref 3–18)
APPEARANCE UR: CLEAR
AST SERPL W P-5'-P-CCNC: 12 U/L (ref 10–38)
BACTERIA URNS QL MICRO: NEGATIVE /HPF
BASOPHILS # BLD: 0 K/UL (ref 0–0.1)
BASOPHILS NFR BLD: 0 % (ref 0–2)
BILIRUB SERPL-MCNC: 0.2 MG/DL (ref 0.2–1)
BILIRUB UR QL: NEGATIVE
BUN SERPL-MCNC: 14 MG/DL (ref 7–18)
BUN/CREAT SERPL: 15 (ref 12–20)
CA-I BLD-MCNC: 9.6 MG/DL (ref 8.5–10.1)
CHLORIDE SERPL-SCNC: 103 MMOL/L (ref 100–111)
CO2 SERPL-SCNC: 27 MMOL/L (ref 21–32)
COLOR UR: YELLOW
CREAT SERPL-MCNC: 0.95 MG/DL (ref 0.6–1.3)
DIFFERENTIAL METHOD BLD: ABNORMAL
EOSINOPHIL # BLD: 0.2 K/UL (ref 0–0.4)
EOSINOPHIL NFR BLD: 3 % (ref 0–5)
EPITH CASTS URNS QL MICRO: NORMAL /LPF (ref 0–20)
ERYTHROCYTE [DISTWIDTH] IN BLOOD BY AUTOMATED COUNT: 14.9 % (ref 11.6–14.5)
GLOBULIN SER CALC-MCNC: 4.7 G/DL (ref 2–4)
GLUCOSE SERPL-MCNC: 155 MG/DL (ref 74–99)
GLUCOSE UR STRIP.AUTO-MCNC: NEGATIVE MG/DL
HCG SERPL QL: NEGATIVE
HCT VFR BLD AUTO: 34.2 % (ref 35–45)
HGB BLD-MCNC: 11.7 G/DL (ref 12–16)
HGB UR QL STRIP: NEGATIVE
IMM GRANULOCYTES # BLD AUTO: 0 K/UL (ref 0–0.04)
IMM GRANULOCYTES NFR BLD AUTO: 0 % (ref 0–0.5)
KETONES UR QL STRIP.AUTO: NEGATIVE MG/DL
LEUKOCYTE ESTERASE UR QL STRIP.AUTO: NEGATIVE
LYMPHOCYTES # BLD: 2.1 K/UL (ref 0.9–3.6)
LYMPHOCYTES NFR BLD: 31 % (ref 21–52)
MCH RBC QN AUTO: 29.5 PG (ref 24–34)
MCHC RBC AUTO-ENTMCNC: 34.2 G/DL (ref 31–37)
MCV RBC AUTO: 86.4 FL (ref 78–100)
MONOCYTES # BLD: 0.5 K/UL (ref 0.05–1.2)
MONOCYTES NFR BLD: 7 % (ref 3–10)
NEUTS SEG # BLD: 4 K/UL (ref 1.8–8)
NEUTS SEG NFR BLD: 59 % (ref 40–73)
NITRITE UR QL STRIP.AUTO: NEGATIVE
NRBC # BLD: 0 K/UL (ref 0–0.01)
NRBC BLD-RTO: 0 PER 100 WBC
PH UR STRIP: 5 (ref 5–8)
PLATELET # BLD AUTO: 384 K/UL (ref 135–420)
PMV BLD AUTO: 9.7 FL (ref 9.2–11.8)
POTASSIUM SERPL-SCNC: 3.5 MMOL/L (ref 3.5–5.5)
PROT SERPL-MCNC: 8 G/DL (ref 6.4–8.2)
PROT UR STRIP-MCNC: ABNORMAL MG/DL
RBC # BLD AUTO: 3.96 M/UL (ref 4.2–5.3)
RBC #/AREA URNS HPF: NORMAL /HPF (ref 0–2)
SODIUM SERPL-SCNC: 140 MMOL/L (ref 136–145)
SP GR UR REFRACTOMETRY: >1.03 (ref 1–1.03)
UROBILINOGEN UR QL STRIP.AUTO: 0.2 EU/DL (ref 0.2–1)
WBC # BLD AUTO: 6.7 K/UL (ref 4.6–13.2)
WBC URNS QL MICRO: NORMAL /HPF (ref 0–4)

## 2024-09-25 PROCEDURE — 85025 COMPLETE CBC W/AUTO DIFF WBC: CPT

## 2024-09-25 PROCEDURE — 74176 CT ABD & PELVIS W/O CONTRAST: CPT

## 2024-09-25 PROCEDURE — 80053 COMPREHEN METABOLIC PANEL: CPT

## 2024-09-25 PROCEDURE — 84703 CHORIONIC GONADOTROPIN ASSAY: CPT

## 2024-09-25 PROCEDURE — 96374 THER/PROPH/DIAG INJ IV PUSH: CPT

## 2024-09-25 PROCEDURE — 99284 EMERGENCY DEPT VISIT MOD MDM: CPT

## 2024-09-25 PROCEDURE — 6370000000 HC RX 637 (ALT 250 FOR IP): Performed by: EMERGENCY MEDICINE

## 2024-09-25 PROCEDURE — 81001 URINALYSIS AUTO W/SCOPE: CPT

## 2024-09-25 PROCEDURE — 6360000002 HC RX W HCPCS: Performed by: EMERGENCY MEDICINE

## 2024-09-25 RX ORDER — KETOROLAC TROMETHAMINE 30 MG/ML
30 INJECTION, SOLUTION INTRAMUSCULAR; INTRAVENOUS ONCE
Status: DISCONTINUED | OUTPATIENT
Start: 2024-09-25 | End: 2024-09-25

## 2024-09-25 RX ORDER — NAPROXEN 500 MG/1
500 TABLET ORAL 2 TIMES DAILY PRN
Qty: 20 TABLET | Refills: 0 | Status: SHIPPED | OUTPATIENT
Start: 2024-09-25 | End: 2024-10-05

## 2024-09-25 RX ORDER — KETOROLAC TROMETHAMINE 30 MG/ML
15 INJECTION, SOLUTION INTRAMUSCULAR; INTRAVENOUS ONCE
Status: COMPLETED | OUTPATIENT
Start: 2024-09-25 | End: 2024-09-25

## 2024-09-25 RX ORDER — METHOCARBAMOL 500 MG/1
500 TABLET, FILM COATED ORAL 4 TIMES DAILY PRN
Qty: 20 TABLET | Refills: 0 | Status: SHIPPED | OUTPATIENT
Start: 2024-09-25 | End: 2024-09-30

## 2024-09-25 RX ORDER — LIDOCAINE 4 G/G
1 PATCH TOPICAL
Status: DISCONTINUED | OUTPATIENT
Start: 2024-09-25 | End: 2024-09-25 | Stop reason: HOSPADM

## 2024-09-25 RX ORDER — OXYCODONE HYDROCHLORIDE 10 MG/1
10 TABLET ORAL
Status: COMPLETED | OUTPATIENT
Start: 2024-09-25 | End: 2024-09-25

## 2024-09-25 RX ORDER — LIDOCAINE 50 MG/G
1 PATCH TOPICAL EVERY 24 HOURS
Qty: 30 PATCH | Refills: 0 | Status: SHIPPED | OUTPATIENT
Start: 2024-09-25 | End: 2024-10-25

## 2024-09-25 RX ORDER — ACETAMINOPHEN 500 MG
1000 TABLET ORAL 4 TIMES DAILY PRN
Qty: 30 TABLET | Refills: 0 | Status: SHIPPED | OUTPATIENT
Start: 2024-09-25

## 2024-09-25 RX ADMIN — OXYCODONE HYDROCHLORIDE 10 MG: 10 TABLET ORAL at 09:37

## 2024-09-25 RX ADMIN — KETOROLAC TROMETHAMINE 15 MG: 30 INJECTION, SOLUTION INTRAMUSCULAR at 09:44

## 2024-09-25 ASSESSMENT — PAIN DESCRIPTION - ORIENTATION
ORIENTATION: LEFT
ORIENTATION: LEFT

## 2024-09-25 ASSESSMENT — PAIN SCALES - GENERAL
PAINLEVEL_OUTOF10: 9
PAINLEVEL_OUTOF10: 2

## 2024-09-25 ASSESSMENT — PAIN - FUNCTIONAL ASSESSMENT: PAIN_FUNCTIONAL_ASSESSMENT: 0-10

## 2024-09-25 ASSESSMENT — PAIN DESCRIPTION - LOCATION
LOCATION: FLANK
LOCATION: FLANK

## 2024-10-03 ENCOUNTER — OFFICE VISIT (OUTPATIENT)
Facility: CLINIC | Age: 39
End: 2024-10-03
Payer: MEDICAID

## 2024-10-03 VITALS
BODY MASS INDEX: 41.46 KG/M2 | RESPIRATION RATE: 18 BRPM | OXYGEN SATURATION: 100 % | HEIGHT: 66 IN | HEART RATE: 83 BPM | WEIGHT: 258 LBS | SYSTOLIC BLOOD PRESSURE: 120 MMHG | DIASTOLIC BLOOD PRESSURE: 74 MMHG | TEMPERATURE: 97.4 F

## 2024-10-03 DIAGNOSIS — E06.3 HYPOTHYROIDISM DUE TO HASHIMOTO'S THYROIDITIS: ICD-10-CM

## 2024-10-03 DIAGNOSIS — I10 ESSENTIAL HYPERTENSION: ICD-10-CM

## 2024-10-03 DIAGNOSIS — D25.9 UTERINE LEIOMYOMA, UNSPECIFIED LOCATION: ICD-10-CM

## 2024-10-03 DIAGNOSIS — R10.9 FLANK PAIN: Primary | ICD-10-CM

## 2024-10-03 PROCEDURE — 3078F DIAST BP <80 MM HG: CPT | Performed by: NURSE PRACTITIONER

## 2024-10-03 PROCEDURE — 99213 OFFICE O/P EST LOW 20 MIN: CPT | Performed by: NURSE PRACTITIONER

## 2024-10-03 PROCEDURE — 3074F SYST BP LT 130 MM HG: CPT | Performed by: NURSE PRACTITIONER

## 2024-10-03 RX ORDER — TRAZODONE HYDROCHLORIDE 50 MG/1
50 TABLET, FILM COATED ORAL NIGHTLY PRN
COMMUNITY

## 2024-10-03 RX ORDER — LEVOTHYROXINE SODIUM 50 UG/1
50 TABLET ORAL DAILY
Qty: 30 TABLET | Refills: 0 | Status: SHIPPED | OUTPATIENT
Start: 2024-10-03 | End: 2024-11-02

## 2024-10-03 RX ORDER — METHOCARBAMOL 500 MG/1
500 TABLET, FILM COATED ORAL 4 TIMES DAILY PRN
COMMUNITY

## 2024-10-03 NOTE — PROGRESS NOTES
Chief Complaint   Patient presents with    Follow-up     [facility] ER visit on 9-25-24 for left Flank pain, pain has resolved, taking       \"Have you been to the ER, urgent care clinic since your last visit?  Hospitalized since your last visit?\"    See above note    “Have you seen or consulted any other health care providers outside of [facility] since your last visit?”    NO        “Have you had a pap smear?”    NO    Date of last Cervical Cancer screen (HPV or PAP): 9/11/2013

## 2024-10-03 NOTE — PROGRESS NOTES
Gurwinder Pretty (: 1985) is a 39 y.o. female patient, here for evaluation of the following chief complaint(s):  Follow-up ([facility] ER visit on 24 for left Flank pain, pain has resolved, taking)       ASSESSMENT/PLAN:  Assessment & Plan  Flank pain  Pain is resolved  Monitor as needed         Essential hypertension  Stable continue as directed         Hypothyroidism due to Hashimoto's thyroiditis  Lab work was ordered patient has not gotten it yet recommend she get so we can continue to monitor    Orders:    levothyroxine (SYNTHROID) 50 MCG tablet; Take 1 tablet by mouth Daily    Uterine leiomyoma, unspecified location  Patient has an OB/GYN and I recommend that she contact them about heavy vaginal bleeding with enlarged uterus with fibroid                Patient has blood work pending which she has not gotten done yet she knows that I recommend she get this done and we will further discuss results available      No follow-ups on file.      SUBJECTIVE/OBJECTIVE:  39-year-old female presents because she went to the ER  had flank pain  Pain is resolved  Patient is only taking muscle relaxant once a day maybe much better   \CT did show possible enlarged uterus from fibroids I did mention this to the patient I recommend that she follow with GYN she sees specialist for women and has established relationship, she should call and make an appointment  Patient states she is almost out of medicine for her thyroid  Did remind patient she has blood work that is ordered so we can continue to monitor thyroid she states she will get blood work done and we will review when it is available      No report today of fever chills sweats chest pain pain with urination nausea vomiting weakness flank pain      /74 (Site: Left Upper Arm, Position: Sitting)   Pulse 83   Temp 97.4 °F (36.3 °C) (Temporal)   Resp 18   Ht 1.676 m (5' 6\")   Wt 117 kg (258 lb)   SpO2 100%   BMI 41.64 kg/m²        Physical Exam

## 2024-12-17 ENCOUNTER — HOSPITAL ENCOUNTER (EMERGENCY)
Age: 39
Discharge: HOME | End: 2024-12-17
Attending: EMERGENCY MEDICINE
Payer: COMMERCIAL

## 2024-12-17 ENCOUNTER — APPOINTMENT (OUTPATIENT)
Age: 39
End: 2024-12-17
Payer: COMMERCIAL

## 2024-12-17 VITALS
DIASTOLIC BLOOD PRESSURE: 79 MMHG | RESPIRATION RATE: 16 BRPM | HEIGHT: 66 IN | TEMPERATURE: 98.7 F | WEIGHT: 250 LBS | BODY MASS INDEX: 40.18 KG/M2 | HEART RATE: 85 BPM | SYSTOLIC BLOOD PRESSURE: 132 MMHG | OXYGEN SATURATION: 98 %

## 2024-12-17 DIAGNOSIS — S49.91XA INJURY OF RIGHT SHOULDER, INITIAL ENCOUNTER: Primary | ICD-10-CM

## 2024-12-17 PROCEDURE — 96372 THER/PROPH/DIAG INJ SC/IM: CPT

## 2024-12-17 PROCEDURE — 99284 EMERGENCY DEPT VISIT MOD MDM: CPT

## 2024-12-17 PROCEDURE — 73030 X-RAY EXAM OF SHOULDER: CPT

## 2024-12-17 PROCEDURE — 6360000002 HC RX W HCPCS: Performed by: EMERGENCY MEDICINE

## 2024-12-17 RX ORDER — KETOROLAC TROMETHAMINE 30 MG/ML
30 INJECTION, SOLUTION INTRAMUSCULAR; INTRAVENOUS
Status: COMPLETED | OUTPATIENT
Start: 2024-12-17 | End: 2024-12-17

## 2024-12-17 RX ORDER — KETOROLAC TROMETHAMINE 10 MG/1
10 TABLET, FILM COATED ORAL EVERY 6 HOURS PRN
Qty: 20 TABLET | Refills: 0 | Status: SHIPPED | OUTPATIENT
Start: 2024-12-17

## 2024-12-17 RX ADMIN — KETOROLAC TROMETHAMINE 30 MG: 30 INJECTION, SOLUTION INTRAMUSCULAR at 19:55

## 2024-12-17 ASSESSMENT — PAIN DESCRIPTION - DESCRIPTORS: DESCRIPTORS: TINGLING

## 2024-12-17 ASSESSMENT — PAIN - FUNCTIONAL ASSESSMENT: PAIN_FUNCTIONAL_ASSESSMENT: 0-10

## 2024-12-17 ASSESSMENT — PAIN SCALES - GENERAL: PAINLEVEL_OUTOF10: 9

## 2024-12-17 ASSESSMENT — PAIN DESCRIPTION - LOCATION: LOCATION: SHOULDER;NECK

## 2024-12-17 ASSESSMENT — PAIN DESCRIPTION - ORIENTATION: ORIENTATION: RIGHT
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
## 2024-12-18 ENCOUNTER — HOSPITAL ENCOUNTER (OUTPATIENT)
Age: 39
Discharge: HOME | End: 2024-12-21

## 2024-12-18 DIAGNOSIS — K21.9 GASTROESOPHAGEAL REFLUX DISEASE WITHOUT ESOPHAGITIS: ICD-10-CM

## 2024-12-18 LAB — SENTARA SPECIMEN COLLECTION: NORMAL

## 2024-12-18 PROCEDURE — 99001 SPECIMEN HANDLING PT-LAB: CPT

## 2024-12-18 RX ORDER — OMEPRAZOLE 40 MG/1
CAPSULE, DELAYED RELEASE ORAL
Qty: 90 CAPSULE | Refills: 1 | Status: SHIPPED | OUTPATIENT
Start: 2024-12-18

## 2024-12-18 NOTE — TELEPHONE ENCOUNTER
Pt walk in and request a refill of her acid reflux medication omeprazole - please advise - pt states she used all her refills on the bottle. Pt does have an upcoming appt 12/20/24.

## 2024-12-18 NOTE — DISCHARGE INSTRUCTIONS
May follow-up with Workmen's Comp. provider, PCP or Ortho in 1 to 2 days for further evaluation.  No strenuous activities with right hand, no heavy lifting more than 10 pounds.

## 2024-12-19 LAB
A/G RATIO: 1.3 RATIO (ref 1.1–2.6)
ALBUMIN: 4.1 G/DL (ref 3.5–5)
ALP BLD-CCNC: 65 U/L (ref 25–115)
ALT SERPL-CCNC: 15 U/L (ref 5–40)
ANION GAP SERPL CALCULATED.3IONS-SCNC: 14 MMOL/L (ref 3–15)
AST SERPL-CCNC: 9 U/L (ref 10–37)
BASOPHILS ABSOLUTE: 0 K/UL (ref 0–0.2)
BASOPHILS RELATIVE PERCENT: 1 % (ref 0–2)
BILIRUB SERPL-MCNC: 0.2 MG/DL (ref 0.2–1.2)
BUN BLDV-MCNC: 14 MG/DL (ref 6–22)
CALCIUM SERPL-MCNC: 9.3 MG/DL (ref 8.4–10.5)
CHLORIDE BLD-SCNC: 98 MMOL/L (ref 98–110)
CHOLESTEROL, TOTAL: 265 MG/DL (ref 110–200)
CHOLESTEROL/HDL RATIO: 5.3 (ref 0–5)
CO2: 27 MMOL/L (ref 20–32)
CREAT SERPL-MCNC: 0.8 MG/DL (ref 0.5–1.2)
EOSINOPHIL # BLD: 2 % (ref 0–6)
EOSINOPHILS ABSOLUTE: 0.1 K/UL (ref 0–0.5)
ESTIMATED AVERAGE GLUCOSE: 173 MG/DL (ref 91–123)
FERRITIN: 65 NG/ML (ref 10–291)
GFR, ESTIMATED: >60 ML/MIN/1.73 SQ.M.
GLOBULIN: 3.1 G/DL (ref 2–4)
GLUCOSE: 166 MG/DL (ref 70–99)
HBA1C MFR BLD: 7.6 % (ref 4.8–5.6)
HCT VFR BLD CALC: 34.4 % (ref 35.1–46.5)
HDLC SERPL-MCNC: 50 MG/DL
HEMOGLOBIN: 11.3 G/DL (ref 11.7–15.5)
IRON % SATURATION: 15 % (ref 20–50)
IRON: 50 MCG/DL (ref 30–160)
LDL CHOLESTEROL: 181 MG/DL (ref 50–99)
LDL/HDL RATIO: 3.6
LYMPHOCYTES # BLD: 33 % (ref 20–45)
LYMPHOCYTES ABSOLUTE: 1.9 K/UL (ref 1–4.8)
MCH RBC QN AUTO: 29 PG (ref 26–34)
MCHC RBC AUTO-ENTMCNC: 33 G/DL (ref 31–36)
MCV RBC AUTO: 88 FL (ref 80–99)
MONOCYTES ABSOLUTE: 0.4 K/UL (ref 0.1–1)
MONOCYTES: 7 % (ref 3–12)
NEUTROPHILS ABSOLUTE: 3.2 K/UL (ref 1.8–7.7)
NEUTROPHILS: 57 % (ref 40–75)
NON-HDL CHOLESTEROL: 215 MG/DL
PDW BLD-RTO: 14.9 % (ref 10–15.5)
PLATELET # BLD: 389 K/UL (ref 140–440)
PMV BLD AUTO: 10.8 FL (ref 9–13)
POTASSIUM SERPL-SCNC: 3.4 MMOL/L (ref 3.5–5.5)
RBC # BLD: 3.92 M/UL (ref 3.8–5.2)
SODIUM BLD-SCNC: 139 MMOL/L (ref 133–145)
TOTAL IRON BINDING CAPACITY: 335 MCG/DL (ref 228–428)
TOTAL PROTEIN: 7.2 G/DL (ref 6.4–8.3)
TRIGL SERPL-MCNC: 170 MG/DL (ref 40–149)
TSH SERPL DL<=0.05 MIU/L-ACNC: 0.75 MCU/ML (ref 0.27–4.2)
UIBC: 285 MCG/DL (ref 110–370)
VITAMIN B-12: 1270 PG/ML (ref 211–911)
VITAMIN D 25-HYDROXY: 35.6 NG/ML (ref 32–100)
VLDLC SERPL CALC-MCNC: 34 MG/DL (ref 8–30)
WBC # BLD: 5.6 K/UL (ref 4–11)

## 2024-12-21 NOTE — ED PROVIDER NOTES
Continue Symbicort as is.     Continue Nasacort 1 spray in each nostril once daily.   Use azelastine 1 spray in each nostril twice daily as needed.     Take cetirizine 5-10 mg by mouth once daily as needed.     If labs have been ordered/completed, please allow 7-14 business days for final interpretation of results to be sent on My Chart, phone or mail. Some lab results can take up to 28 days for results.       Allergy Staff Appt Hours Shot Hours Locations    Physician     Lázaro Allen MD       Support Staff     Ann Figueredo CMA    Tuesday:   [place] :  [place] :         :  [place] 7-3     [place]        Tuesday: :: : 7:10        Tuesday: 7:10        Thursday: 73:10    [facility]       Tues & Wed: :10       Thurs: 12-4:10       Fri:            [facility]  [address] [place] [address]  Appt Line: [phone]      [facility]  [address] [place] [address]  Appt Line: [phone]    Pulmonary Function Scheduling:  [place]: [phone]  [place]: [phone]  [phone]         Nichole Purdy MD  12/21/24 0543       Nichole Purdy MD  12/21/24 0556

## 2025-01-30 ENCOUNTER — OFFICE VISIT (OUTPATIENT)
Facility: CLINIC | Age: 40
End: 2025-01-30

## 2025-01-30 VITALS
HEART RATE: 86 BPM | DIASTOLIC BLOOD PRESSURE: 70 MMHG | BODY MASS INDEX: 38.57 KG/M2 | RESPIRATION RATE: 18 BRPM | HEIGHT: 66 IN | SYSTOLIC BLOOD PRESSURE: 104 MMHG | OXYGEN SATURATION: 98 % | TEMPERATURE: 97.9 F | WEIGHT: 240 LBS

## 2025-01-30 DIAGNOSIS — I10 ESSENTIAL HYPERTENSION: Primary | ICD-10-CM

## 2025-01-30 DIAGNOSIS — E78.2 MIXED HYPERLIPIDEMIA: ICD-10-CM

## 2025-01-30 DIAGNOSIS — E06.3 HYPOTHYROIDISM DUE TO HASHIMOTO'S THYROIDITIS: ICD-10-CM

## 2025-01-30 DIAGNOSIS — F31.81 BIPOLAR II DISORDER (HCC): ICD-10-CM

## 2025-01-30 DIAGNOSIS — E11.9 TYPE 2 DIABETES MELLITUS WITHOUT COMPLICATION, WITHOUT LONG-TERM CURRENT USE OF INSULIN (HCC): ICD-10-CM

## 2025-01-30 DIAGNOSIS — Z23 IMMUNIZATION DUE: ICD-10-CM

## 2025-01-30 RX ORDER — LEVOTHYROXINE SODIUM 50 UG/1
50 TABLET ORAL DAILY
Qty: 90 TABLET | Refills: 1 | Status: SHIPPED | OUTPATIENT
Start: 2025-01-30

## 2025-01-30 RX ORDER — NAPROXEN 500 MG/1
500 TABLET ORAL 2 TIMES DAILY PRN
COMMUNITY

## 2025-01-30 RX ORDER — ROSUVASTATIN CALCIUM 10 MG/1
10 TABLET, COATED ORAL DAILY
Qty: 90 TABLET | Refills: 1 | Status: SHIPPED | OUTPATIENT
Start: 2025-01-30

## 2025-01-30 RX ORDER — CARVEDILOL 12.5 MG/1
12.5 TABLET ORAL 2 TIMES DAILY WITH MEALS
COMMUNITY
Start: 2025-01-26

## 2025-01-30 RX ORDER — METFORMIN HYDROCHLORIDE 500 MG/1
1000 TABLET, EXTENDED RELEASE ORAL
Qty: 180 TABLET | Refills: 0 | Status: SHIPPED | OUTPATIENT
Start: 2025-01-30 | End: 2025-04-30

## 2025-01-30 RX ORDER — BUSPIRONE HYDROCHLORIDE 10 MG/1
10 TABLET ORAL DAILY
COMMUNITY
Start: 2024-12-21

## 2025-01-30 SDOH — ECONOMIC STABILITY: FOOD INSECURITY: WITHIN THE PAST 12 MONTHS, THE FOOD YOU BOUGHT JUST DIDN'T LAST AND YOU DIDN'T HAVE MONEY TO GET MORE.: SOMETIMES TRUE

## 2025-01-30 SDOH — ECONOMIC STABILITY: FOOD INSECURITY: WITHIN THE PAST 12 MONTHS, YOU WORRIED THAT YOUR FOOD WOULD RUN OUT BEFORE YOU GOT MONEY TO BUY MORE.: SOMETIMES TRUE

## 2025-01-30 ASSESSMENT — PATIENT HEALTH QUESTIONNAIRE - PHQ9
10. IF YOU CHECKED OFF ANY PROBLEMS, HOW DIFFICULT HAVE THESE PROBLEMS MADE IT FOR YOU TO DO YOUR WORK, TAKE CARE OF THINGS AT HOME, OR GET ALONG WITH OTHER PEOPLE: NOT DIFFICULT AT ALL
1. LITTLE INTEREST OR PLEASURE IN DOING THINGS: SEVERAL DAYS
2. FEELING DOWN, DEPRESSED OR HOPELESS: SEVERAL DAYS
SUM OF ALL RESPONSES TO PHQ QUESTIONS 1-9: 4
9. THOUGHTS THAT YOU WOULD BE BETTER OFF DEAD, OR OF HURTING YOURSELF: NOT AT ALL
6. FEELING BAD ABOUT YOURSELF - OR THAT YOU ARE A FAILURE OR HAVE LET YOURSELF OR YOUR FAMILY DOWN: NOT AT ALL
8. MOVING OR SPEAKING SO SLOWLY THAT OTHER PEOPLE COULD HAVE NOTICED. OR THE OPPOSITE, BEING SO FIGETY OR RESTLESS THAT YOU HAVE BEEN MOVING AROUND A LOT MORE THAN USUAL: NOT AT ALL
7. TROUBLE CONCENTRATING ON THINGS, SUCH AS READING THE NEWSPAPER OR WATCHING TELEVISION: NOT AT ALL
5. POOR APPETITE OR OVEREATING: NOT AT ALL
SUM OF ALL RESPONSES TO PHQ9 QUESTIONS 1 & 2: 2
4. FEELING TIRED OR HAVING LITTLE ENERGY: SEVERAL DAYS
3. TROUBLE FALLING OR STAYING ASLEEP: SEVERAL DAYS
SUM OF ALL RESPONSES TO PHQ QUESTIONS 1-9: 4

## 2025-01-30 NOTE — PROGRESS NOTES
Gurwinder Pretty (: 1985) is a 39 y.o. female patient, here for evaluation of the following chief complaint(s):  Follow-up (Review lab results)       ASSESSMENT/PLAN:  Assessment & Plan  Type 2 diabetes mellitus without complication, without long-term current use of insulin (HCC)  Patient reports metformin use not consistent due to adverse side effects stomach upset diarrhea  Patient aware tries appetite ordered discussed in detail how to utilize medication  Patient aware she should MyChart message for further refills titratable  Glucagon-like peptide-1 agonist  Patient referred to [facility] diabetic education/management program      Discussed below with patient:        Adverse medication side effects both rare and common discussed with patient  Nausea and diarrhea are common side effects likely to improve over time.    Studies in rats showed increased risk for C cell tumors that most closely correlates with medullary carcinoma of the thyroid in humans. A personal history of this type of thyroid cancer, medullary thyroid cancer, or a cancer syndrome called Multiple Endocrone Neoplasia (MEN) would preclude the use of these medications.    This medication has been associated with increase in gallbladder disease, pancreatitis, and possible bowel obstruction, delayed gastric emptying that may contribute to nausea, vomiting, and postponement absorption of other medication.  Most common side effect nausea ,vomiting ,constipation/diarrhea.       Patient does not report any personal history or knowledge of thyroid cancer, men's 2.    The patient is aware that this is a titratable medication.  We discussed in a plan of care that the patient needs to notify me at the end of her third administration of medication, she needs to let me know her dosage and how the patient is doing on the medicine.,  For continued refill and titration.  We also discussed in detail the importance of healthy lifestyle changes, such as

## 2025-01-30 NOTE — PATIENT INSTRUCTIONS
[facility] Food Resources*  (Call United Way/211 if need more resources.)    [facility] and the [facility]  What they offer: Assistance with finding a food pantry, soup kitchen or resource near you.  Website: https://[employer]line.org/get-help/community-resources/  Provide instructions for assistance with SNAP ([place]) application on this site.     SNAP (formerly [place])  What they offer: SNAP is used like cash to buy eligible food items from authorized retailers.  Apply for benefits online: https://www.CAP[place]p.[place].gov/  Apply for benefits by telephone: 571.912.2216  Apply for benefits at local Department of      Meals on Wheels   What they offer: Meals on Wheels is a program that delivers meals to individuals age 60+ at home who are unable to purchase or prepare their own meals.   Website: https://opinions.h.org/how-we-help/meals-on-wheels/  Requirements can vary by program and areas served.   To apply:  Contact [facility]: [phone] (Mon -Fri 8:30 a.m. to 4:30 p.m.)  Coverage Area:  [facility], [facility]  Website: [email or web address].[email or web address].[email or web address]/contact-us/  Contact [phone] (Mon - Fri 8:00am to 5:30pm)  Coverage Areas: [facility], [facility].    [facility]  What they offer:  [employer] provides comprehensive services to the disadvantaged/low-income community, homebound seniors and homeless in [place], [facility], and [facility].  Phone Number: [phone]  [address] [place] [address]  Services:  Food pantry (Monday, Wednesday, Friday), Soup kitchen, Senior Grocery Delivery Program, Food Bank, hygiene essentials, aid with completing SNAP applications.   Website:

## 2025-01-30 NOTE — PROGRESS NOTES
Chief Complaint   Patient presents with    Follow-up     Review lab results       \"Have you been to the ER, urgent care clinic since your last visit?  Hospitalized since your last visit?\"    YES, 12-17-24 ER [facility], shoulder pain    “Have you seen or consulted any other health care providers outside our system since your last visit?”    YES, Courtney Evans NP, [facility]     “Have you had a pap smear?”    NO    Date of last Cervical Cancer screen (HPV or PAP): 9/11/2013       “Have you had a diabetic eye exam?”    NO, will make appointment     No diabetic eye exam on file

## 2025-01-30 NOTE — ASSESSMENT & PLAN NOTE
Patient willing to take medication  Crestor  We did discuss dietary changes   How can you help prevent high cholesterol?    Eat high-fiber foods, such as fruits vegetables and whole grains  Eat lean proteins, such as seafood, lean meats, and beans.  Eat healthy fats, such as  olive oil  Choose foods that are low in saturated fat, such as avocados, nuts, and low-fat milk or yogurt.  Limit sodium and alcohol  Limit drinks and foods with added sugar  Try to be active at least 2-1/2 hours a week  Get to and stay at a healthy weight.    Do not use tobacco or nicotine and discuss with your provider if you need help quitting  Look at the Mediterranean diet guidelines               Orders:    rosuvastatin (CRESTOR) 10 MG tablet; Take 1 tablet by mouth daily    Lipid Panel; Future

## 2025-01-30 NOTE — ASSESSMENT & PLAN NOTE
Patient reports metformin use not consistent due to adverse side effects stomach upset diarrhea  Patient aware tries appetite ordered discussed in detail how to utilize medication  Patient aware she should MyChart message for further refills titratable  Glucagon-like peptide-1 agonist  Patient referred to [facility] diabetic education/management program      Discussed below with patient:        Adverse medication side effects both rare and common discussed with patient  Nausea and diarrhea are common side effects likely to improve over time.    Studies in rats showed increased risk for C cell tumors that most closely correlates with medullary carcinoma of the thyroid in humans. A personal history of this type of thyroid cancer, medullary thyroid cancer, or a cancer syndrome called Multiple Endocrone Neoplasia (MEN) would preclude the use of these medications.    This medication has been associated with increase in gallbladder disease, pancreatitis, and possible bowel obstruction, delayed gastric emptying that may contribute to nausea, vomiting, and postponement absorption of other medication.  Most common side effect nausea ,vomiting ,constipation/diarrhea.       Patient does not report any personal history or knowledge of thyroid cancer, men's 2.    The patient is aware that this is a titratable medication.  We discussed in a plan of care that the patient needs to notify me at the end of her third administration of medication, she needs to let me know her dosage and how the patient is doing on the medicine.,  For continued refill and titration.  We also discussed in detail the importance of healthy lifestyle changes, such as dietary changes,    Recommended that the patient should tell healthcare provider if noticed a lump or swelling in neck, hoarseness, trouble swallowing, or shortness of breath.     Helpful tips for nausea: Slowly eat smaller more frequent meals.  Eat foods that are light and bland, if

## 2025-02-03 ENCOUNTER — HOSPITAL ENCOUNTER (EMERGENCY)
Age: 40
Discharge: HOME | End: 2025-02-04
Attending: FAMILY MEDICINE
Payer: MEDICAID

## 2025-02-03 DIAGNOSIS — J10.1 INFLUENZA A: Primary | ICD-10-CM

## 2025-02-03 LAB
FLUAV RNA SPEC QL NAA+PROBE: DETECTED
FLUBV RNA SPEC QL NAA+PROBE: NOT DETECTED
S PYO DNA THROAT QL NAA+PROBE: NOT DETECTED
SARS-COV-2 RNA RESP QL NAA+PROBE: NOT DETECTED

## 2025-02-03 PROCEDURE — 87636 SARSCOV2 & INF A&B AMP PRB: CPT

## 2025-02-03 PROCEDURE — 87651 STREP A DNA AMP PROBE: CPT

## 2025-02-03 PROCEDURE — 99284 EMERGENCY DEPT VISIT MOD MDM: CPT

## 2025-02-03 RX ORDER — KETOROLAC TROMETHAMINE 30 MG/ML
30 INJECTION, SOLUTION INTRAMUSCULAR; INTRAVENOUS
Status: COMPLETED | OUTPATIENT
Start: 2025-02-04 | End: 2025-02-04

## 2025-02-03 RX ORDER — BENZONATATE 200 MG/1
200 CAPSULE ORAL 3 TIMES DAILY PRN
Qty: 20 CAPSULE | Refills: 0 | Status: SHIPPED | OUTPATIENT
Start: 2025-02-03 | End: 2025-02-13

## 2025-02-03 RX ORDER — BENZONATATE 100 MG/1
200 CAPSULE ORAL
Status: COMPLETED | OUTPATIENT
Start: 2025-02-04 | End: 2025-02-04

## 2025-02-04 VITALS
BODY MASS INDEX: 38.57 KG/M2 | WEIGHT: 240 LBS | HEIGHT: 66 IN | RESPIRATION RATE: 16 BRPM | HEART RATE: 84 BPM | OXYGEN SATURATION: 100 % | SYSTOLIC BLOOD PRESSURE: 118 MMHG | TEMPERATURE: 99.1 F | DIASTOLIC BLOOD PRESSURE: 74 MMHG

## 2025-02-04 PROCEDURE — 96372 THER/PROPH/DIAG INJ SC/IM: CPT

## 2025-02-04 PROCEDURE — 6370000000 HC RX 637 (ALT 250 FOR IP): Performed by: FAMILY MEDICINE

## 2025-02-04 PROCEDURE — 6360000002 HC RX W HCPCS: Performed by: FAMILY MEDICINE

## 2025-02-04 RX ADMIN — BENZONATATE 200 MG: 100 CAPSULE ORAL at 00:14

## 2025-02-04 RX ADMIN — KETOROLAC TROMETHAMINE 30 MG: 30 INJECTION, SOLUTION INTRAMUSCULAR at 00:14

## 2025-02-04 NOTE — ED TRIAGE NOTES
Patient reports cough, sore throat, and congestion since Thursday when she got her flu shot. Patient denies any fever at home. 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

## 2025-02-04 NOTE — ED PROVIDER NOTES
EMERGENCY DEPARTMENT HISTORY AND PHYSICAL EXAM      Patient Name: Gurwinder Pretty  MRN: [id number]  YOB: 1985  Provider: Esther Mac DO  PCP: Cathie Eduardo FNP     History of Presenting Illness     Chief Complaint   Patient presents with    Cough    Nasal Congestion              History Provided By: Patient     HPI: Gurwinder Pretty, 39 y.o. female  presents to the ED with cc of flulike symptoms. Patient reports cough, sore throat, and congestion since Thursday when she got her flu shot. Patient denies any fever at home. 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  She denies headache, dysphagia, dysphonia, shortness of breath, chest pain, abdominal pain, dysuria, numbness, tingling, neck pain, back pain.  No changes in diet or recent travel.    Past History     Past Medical History:  Past Medical History:   Diagnosis Date    Anxiety     Bipolar II disorder (HCC) 6/26/2018    Depression     Essential hypertension 1/30/2020    Fatigue 7/28/2020    Gastroesophageal reflux disease without esophagitis 1/30/2020    Goiter 6/29/2020    Hyperlipidemia 1/30/2020    Hyperthyroidism 1/3/2019    Hypothyroidism 9/5/2019    Migraine 7/28/2020    Panic attack     Persistent cough 1/30/2020    Postpartum depression 7/28/2020    Prediabetes 6/29/2020    Psychiatric disorder     depression with SI   Migraines     Sleep disorder     Suicidal thoughts        Past Surgical History:  Past Surgical History:   Procedure Laterality Date    GYN      2 c-sections       Medications:  No current facility-administered medications for this encounter.     Current Outpatient Medications   Medication Sig Dispense Refill    benzonatate (TESSALON) 200 MG capsule Take 1 capsule by mouth 3 times daily as needed for Cough 20 capsule 0    carvedilol (COREG) 12.5 MG tablet Take 1 tablet by mouth 2 times daily (with meals)      naproxen (NAPROSYN) 500 MG tablet Take 1 tablet by mouth 2 times daily as needed

## 2025-02-07 DIAGNOSIS — E11.9 TYPE 2 DIABETES MELLITUS WITHOUT COMPLICATION, WITHOUT LONG-TERM CURRENT USE OF INSULIN (HCC): Primary | ICD-10-CM

## 2025-02-07 NOTE — PROGRESS NOTES
Patient insurance signed a form stating that unless the patient tried other medications, 2 of the following Claudia Ayala Victoza.  That they would not cover the Mounjaro prescription sent for Trulicity

## 2025-02-07 NOTE — PROGRESS NOTES
Called patient and notified of medication change and to send provider message after third dose to see how she is doing on the medication. She asked if she could a note to go back to work. Was out due to back pain.

## 2025-02-25 ENCOUNTER — OFFICE VISIT (OUTPATIENT)
Facility: CLINIC | Age: 40
End: 2025-02-25
Payer: MEDICAID

## 2025-02-25 VITALS
HEIGHT: 66 IN | RESPIRATION RATE: 18 BRPM | HEART RATE: 99 BPM | SYSTOLIC BLOOD PRESSURE: 110 MMHG | BODY MASS INDEX: 37.93 KG/M2 | TEMPERATURE: 97.5 F | WEIGHT: 236 LBS | OXYGEN SATURATION: 98 % | DIASTOLIC BLOOD PRESSURE: 74 MMHG

## 2025-02-25 DIAGNOSIS — E11.9 TYPE 2 DIABETES MELLITUS WITHOUT COMPLICATION, WITHOUT LONG-TERM CURRENT USE OF INSULIN (HCC): ICD-10-CM

## 2025-02-25 PROCEDURE — 99213 OFFICE O/P EST LOW 20 MIN: CPT | Performed by: NURSE PRACTITIONER

## 2025-02-25 PROCEDURE — 3074F SYST BP LT 130 MM HG: CPT | Performed by: NURSE PRACTITIONER

## 2025-02-25 PROCEDURE — 3078F DIAST BP <80 MM HG: CPT | Performed by: NURSE PRACTITIONER

## 2025-02-25 NOTE — PROGRESS NOTES
Chief Complaint   Patient presents with    Other     Needs note for work       \"Have you been to the ER, urgent care clinic since your last visit?  Hospitalized since your last visit?\"    NO    “Have you seen or consulted any other health care providers outside our system since your last visit?”    NO     “Have you had a pap smear?”    NO    Date of last Cervical Cancer screen (HPV or PAP): 9/11/2013       “Have you had a diabetic eye exam?”    NO, has appointment on 3-14-25     No diabetic eye exam on file

## 2025-02-25 NOTE — PROGRESS NOTES
Gurwinder Pretty (: 1985) is a 39 y.o. female patient, here for evaluation of the following chief complaint(s):  Other (Needs note for work)       ASSESSMENT/PLAN:  Assessment & Plan  Type 2 diabetes mellitus without complication, without long-term current use of insulin (HCC)  Patient will go up on to the next dose of 1.5 mg of Trulicity  Patient will make me aware she would like to go to the next dose after third dosing of this medication via [employer] messaging           Patient may return to work            No follow-ups on file.      SUBJECTIVE/OBJECTIVE:  39-year-old female reports to the office because she would like a letter to states she can return to work  Patient states she missed work  and was out until she went back within 1 day, and then her 14-year-old son had to go to the ER and was sick with the flu.  The patient states her symptoms have resolved and she is feeling stable and well at this time but was told she needed a note to return to work  Patient is a CNA at [employer]  There is no report today of fever chills sweats nausea vomiting weakness chest pain shortness of breath abdominal pain cough fever chills              /74 (Site: Right Upper Arm, Position: Sitting)   Pulse 99   Temp 97.5 °F (36.4 °C) (Temporal)   Resp 18   Ht 1.676 m (5' 6\")   Wt 107 kg (236 lb)   SpO2 98%   BMI 38.09 kg/m²        Physical Exam   General:  alert, cooperative, well appearing, in no apparent distress.  Eyes:  Pupils are equally round and reactive to light with accommodation.   CV:  The heart sounds are regular in rate and rhythm.  There is a normal S1 and S2.  There are no  murmurs, rubs, or gallops.  Distal pulses are intact and equal.  Lungs:   Lung sounds are clear and equal to auscultation throughout all lung fields without wheezing, rales, or rhonchi.  Inspiratory and expiratory efforts are full and unlabored.  Neurological:    There is no obvious focal sensory or

## 2025-02-25 NOTE — ASSESSMENT & PLAN NOTE
Patient will go up on to the next dose of 1.5 mg of Trulicity  Patient will make me aware she would like to go to the next dose after third dosing of this medication via MyChart messaging

## 2025-04-28 DIAGNOSIS — E78.2 MIXED HYPERLIPIDEMIA: ICD-10-CM

## 2025-04-28 DIAGNOSIS — E11.9 TYPE 2 DIABETES MELLITUS WITHOUT COMPLICATION, WITHOUT LONG-TERM CURRENT USE OF INSULIN (HCC): ICD-10-CM

## 2025-04-28 DIAGNOSIS — E06.3 HYPOTHYROIDISM DUE TO HASHIMOTO'S THYROIDITIS: ICD-10-CM

## 2025-05-14 RX ORDER — ARIPIPRAZOLE 10 MG/1
10 TABLET ORAL NIGHTLY
Qty: 30 TABLET | OUTPATIENT
Start: 2025-05-14

## 2025-05-22 ENCOUNTER — HOSPITAL ENCOUNTER (OUTPATIENT)
Age: 40
Discharge: HOME | End: 2025-05-25

## 2025-05-22 LAB — SENTARA SPECIMEN COLLECTION: NORMAL

## 2025-05-22 PROCEDURE — 99001 SPECIMEN HANDLING PT-LAB: CPT

## 2025-05-23 LAB
CHOLESTEROL, TOTAL: 187 MG/DL (ref 110–200)
CHOLESTEROL/HDL RATIO: 3.3 (ref 0–5)
ESTIMATED AVERAGE GLUCOSE: 154 MG/DL (ref 91–123)
HBA1C MFR BLD: 7 % (ref 4.8–5.6)
HDLC SERPL-MCNC: 56 MG/DL
TSH SERPL DL<=0.05 MIU/L-ACNC: 0.83 MCU/ML (ref 0.27–4.2)

## 2025-05-27 ENCOUNTER — OFFICE VISIT (OUTPATIENT)
Facility: CLINIC | Age: 40
End: 2025-05-27
Payer: MEDICAID

## 2025-05-27 VITALS
BODY MASS INDEX: 37.45 KG/M2 | WEIGHT: 233 LBS | HEIGHT: 66 IN | RESPIRATION RATE: 18 BRPM | TEMPERATURE: 97.6 F | OXYGEN SATURATION: 99 % | DIASTOLIC BLOOD PRESSURE: 74 MMHG | HEART RATE: 91 BPM | SYSTOLIC BLOOD PRESSURE: 111 MMHG

## 2025-05-27 DIAGNOSIS — D64.9 ANEMIA, UNSPECIFIED TYPE: ICD-10-CM

## 2025-05-27 DIAGNOSIS — E11.9 TYPE 2 DIABETES MELLITUS WITHOUT COMPLICATION, WITHOUT LONG-TERM CURRENT USE OF INSULIN (HCC): ICD-10-CM

## 2025-05-27 DIAGNOSIS — I10 ESSENTIAL HYPERTENSION: ICD-10-CM

## 2025-05-27 DIAGNOSIS — E06.3 HYPOTHYROIDISM DUE TO HASHIMOTO'S THYROIDITIS: Primary | ICD-10-CM

## 2025-05-27 DIAGNOSIS — E55.9 VITAMIN D DEFICIENCY: ICD-10-CM

## 2025-05-27 LAB — HBA1C MFR BLD: 6.6 %

## 2025-05-27 PROCEDURE — 3074F SYST BP LT 130 MM HG: CPT | Performed by: NURSE PRACTITIONER

## 2025-05-27 PROCEDURE — 3044F HG A1C LEVEL LT 7.0%: CPT | Performed by: NURSE PRACTITIONER

## 2025-05-27 PROCEDURE — 83036 HEMOGLOBIN GLYCOSYLATED A1C: CPT | Performed by: NURSE PRACTITIONER

## 2025-05-27 PROCEDURE — 99214 OFFICE O/P EST MOD 30 MIN: CPT | Performed by: NURSE PRACTITIONER

## 2025-05-27 PROCEDURE — 3078F DIAST BP <80 MM HG: CPT | Performed by: NURSE PRACTITIONER

## 2025-05-27 RX ORDER — SPIRONOLACTONE 25 MG/1
25 TABLET ORAL DAILY
COMMUNITY

## 2025-05-27 RX ORDER — LEVOTHYROXINE SODIUM 50 UG/1
50 TABLET ORAL DAILY
Qty: 90 TABLET | Refills: 1 | Status: SHIPPED | OUTPATIENT
Start: 2025-05-27

## 2025-05-27 RX ORDER — ERGOCALCIFEROL 1.25 MG/1
50000 CAPSULE, LIQUID FILLED ORAL WEEKLY
Qty: 12 CAPSULE | Refills: 1 | Status: SHIPPED | OUTPATIENT
Start: 2025-05-27

## 2025-05-27 RX ORDER — FLUTICASONE PROPIONATE AND SALMETEROL 50; 500 UG/1; UG/1
1 POWDER RESPIRATORY (INHALATION) 2 TIMES DAILY
COMMUNITY
Start: 2025-04-22

## 2025-05-27 RX ORDER — FERROUS SULFATE 325(65) MG
325 TABLET ORAL EVERY OTHER DAY
Qty: 30 TABLET | Refills: 3 | Status: SHIPPED | OUTPATIENT
Start: 2025-05-27

## 2025-05-27 ASSESSMENT — PATIENT HEALTH QUESTIONNAIRE - PHQ9
SUM OF ALL RESPONSES TO PHQ QUESTIONS 1-9: 4
4. FEELING TIRED OR HAVING LITTLE ENERGY: NOT AT ALL
1. LITTLE INTEREST OR PLEASURE IN DOING THINGS: SEVERAL DAYS
SUM OF ALL RESPONSES TO PHQ QUESTIONS 1-9: 4
5. POOR APPETITE OR OVEREATING: MORE THAN HALF THE DAYS
6. FEELING BAD ABOUT YOURSELF - OR THAT YOU ARE A FAILURE OR HAVE LET YOURSELF OR YOUR FAMILY DOWN: NOT AT ALL
8. MOVING OR SPEAKING SO SLOWLY THAT OTHER PEOPLE COULD HAVE NOTICED. OR THE OPPOSITE, BEING SO FIGETY OR RESTLESS THAT YOU HAVE BEEN MOVING AROUND A LOT MORE THAN USUAL: NOT AT ALL
9. THOUGHTS THAT YOU WOULD BE BETTER OFF DEAD, OR OF HURTING YOURSELF: NOT AT ALL
SUM OF ALL RESPONSES TO PHQ QUESTIONS 1-9: 4
2. FEELING DOWN, DEPRESSED OR HOPELESS: SEVERAL DAYS
3. TROUBLE FALLING OR STAYING ASLEEP: NOT AT ALL
10. IF YOU CHECKED OFF ANY PROBLEMS, HOW DIFFICULT HAVE THESE PROBLEMS MADE IT FOR YOU TO DO YOUR WORK, TAKE CARE OF THINGS AT HOME, OR GET ALONG WITH OTHER PEOPLE: NOT DIFFICULT AT ALL
7. TROUBLE CONCENTRATING ON THINGS, SUCH AS READING THE NEWSPAPER OR WATCHING TELEVISION: NOT AT ALL
SUM OF ALL RESPONSES TO PHQ QUESTIONS 1-9: 4

## 2025-05-27 NOTE — ASSESSMENT & PLAN NOTE
Orders:    vitamin D (ERGOCALCIFEROL) 1.25 MG (11842 UT) CAPS capsule; Take 1 capsule by mouth once a week

## 2025-05-27 NOTE — ASSESSMENT & PLAN NOTE
Stable and at goal  6.6 A1c  Patient is doing well we will go up to 3 mg of Trulicity    Orders:    Albumin/Creatinine Ratio, Urine; Future    AMB POC HEMOGLOBIN A1C    COLLECTION CAPILLARY BLOOD SPECIMEN    Dulaglutide 3 MG/0.5ML SOAJ; Inject 3 mg into the skin once a week     DIABETES FOOT EXAM

## 2025-05-27 NOTE — ASSESSMENT & PLAN NOTE
Stable continue to monitor    Orders:    levothyroxine (SYNTHROID) 50 MCG tablet; Take 1 tablet by mouth Daily

## 2025-05-27 NOTE — ASSESSMENT & PLAN NOTE
Orders:    ferrous sulfate (IRON 325) 325 (65 Fe) MG tablet; Take 1 tablet by mouth every other day .  Take with food.  Monitor for constipation.

## 2025-05-28 LAB
CREATININE, URINE  MG/DL: 265 MG/DL
MICROALBUMIN/CREAT 24H UR: 35.5 MG/L (ref 0.1–17)
MICROALBUMIN/CREAT UR-RTO: 13.4 (ref 0–30)

## 2025-06-03 DIAGNOSIS — F32.A ANXIETY AND DEPRESSION: Primary | ICD-10-CM

## 2025-06-03 DIAGNOSIS — F41.9 ANXIETY AND DEPRESSION: Primary | ICD-10-CM

## 2025-06-03 RX ORDER — BUSPIRONE HYDROCHLORIDE 10 MG/1
10 TABLET ORAL DAILY
Qty: 90 TABLET | Refills: 0 | Status: SHIPPED | OUTPATIENT
Start: 2025-06-03

## 2025-06-03 NOTE — TELEPHONE ENCOUNTER
Pt walked in - needed refill- states pcp filled it before but was refilled by a different provider. Please advise.     [facility] - verify

## 2025-08-06 DIAGNOSIS — E11.9 TYPE 2 DIABETES MELLITUS WITHOUT COMPLICATION, WITHOUT LONG-TERM CURRENT USE OF INSULIN (HCC): ICD-10-CM

## 2025-08-07 RX ORDER — DULAGLUTIDE 3 MG/.5ML
3 INJECTION, SOLUTION SUBCUTANEOUS
Qty: 2 ML | Refills: 2 | Status: SHIPPED | OUTPATIENT
Start: 2025-08-07

## 2025-08-07 RX ORDER — DULAGLUTIDE 3 MG/.5ML
INJECTION, SOLUTION SUBCUTANEOUS
Qty: 2 ML | Refills: 1 | Status: SHIPPED | OUTPATIENT
Start: 2025-08-07 | End: 2025-08-07 | Stop reason: SDUPTHER

## 2025-08-26 DIAGNOSIS — F32.A ANXIETY AND DEPRESSION: ICD-10-CM

## 2025-08-26 DIAGNOSIS — F41.9 ANXIETY AND DEPRESSION: ICD-10-CM

## 2025-08-26 RX ORDER — BUSPIRONE HYDROCHLORIDE 10 MG/1
10 TABLET ORAL DAILY
Qty: 90 TABLET | Refills: 0 | Status: SHIPPED | OUTPATIENT
Start: 2025-08-26